# Patient Record
Sex: MALE | Race: BLACK OR AFRICAN AMERICAN | Employment: FULL TIME | URBAN - METROPOLITAN AREA
[De-identification: names, ages, dates, MRNs, and addresses within clinical notes are randomized per-mention and may not be internally consistent; named-entity substitution may affect disease eponyms.]

---

## 2017-08-27 ENCOUNTER — APPOINTMENT (OUTPATIENT)
Dept: GENERAL RADIOLOGY | Age: 37
End: 2017-08-27
Payer: COMMERCIAL

## 2017-08-27 ENCOUNTER — HOSPITAL ENCOUNTER (EMERGENCY)
Age: 37
Discharge: HOME | End: 2017-08-27
Attending: EMERGENCY MEDICINE
Payer: COMMERCIAL

## 2017-08-27 VITALS
HEIGHT: 74 IN | TEMPERATURE: 97.9 F | OXYGEN SATURATION: 99 % | DIASTOLIC BLOOD PRESSURE: 102 MMHG | WEIGHT: 315 LBS | BODY MASS INDEX: 40.43 KG/M2 | HEART RATE: 91 BPM | RESPIRATION RATE: 16 BRPM | SYSTOLIC BLOOD PRESSURE: 151 MMHG

## 2017-08-27 DIAGNOSIS — M25.561 ACUTE PAIN OF RIGHT KNEE: Primary | ICD-10-CM

## 2017-08-27 PROCEDURE — 6370000000 HC RX 637 (ALT 250 FOR IP): Performed by: EMERGENCY MEDICINE

## 2017-08-27 PROCEDURE — G0382 LEV 3 HOSP TYPE B ED VISIT: HCPCS

## 2017-08-27 PROCEDURE — 73562 X-RAY EXAM OF KNEE 3: CPT

## 2017-08-27 RX ORDER — IBUPROFEN 800 MG/1
800 TABLET ORAL ONCE
Status: COMPLETED | OUTPATIENT
Start: 2017-08-27 | End: 2017-08-27

## 2017-08-27 RX ORDER — IBUPROFEN 800 MG/1
800 TABLET ORAL EVERY 8 HOURS PRN
Qty: 30 TABLET | Refills: 0 | Status: SHIPPED | OUTPATIENT
Start: 2017-08-27 | End: 2018-09-17 | Stop reason: SDUPTHER

## 2017-08-27 RX ORDER — ACETAMINOPHEN 500 MG
1000 TABLET ORAL EVERY 8 HOURS PRN
Qty: 30 TABLET | Refills: 0 | Status: SHIPPED | OUTPATIENT
Start: 2017-08-27 | End: 2018-12-14

## 2017-08-27 RX ADMIN — IBUPROFEN 800 MG: 800 TABLET, FILM COATED ORAL at 11:31

## 2017-08-27 ASSESSMENT — PAIN DESCRIPTION - LOCATION: LOCATION: KNEE

## 2017-08-27 ASSESSMENT — PAIN DESCRIPTION - ORIENTATION: ORIENTATION: RIGHT

## 2017-08-27 ASSESSMENT — PAIN SCALES - GENERAL
PAINLEVEL_OUTOF10: 10
PAINLEVEL_OUTOF10: 10

## 2017-08-27 ASSESSMENT — ENCOUNTER SYMPTOMS
STRIDOR: 0
SHORTNESS OF BREATH: 0
COUGH: 0
WHEEZING: 0

## 2017-08-27 ASSESSMENT — PAIN DESCRIPTION - FREQUENCY: FREQUENCY: INTERMITTENT

## 2017-08-27 ASSESSMENT — PAIN DESCRIPTION - DESCRIPTORS: DESCRIPTORS: ACHING

## 2017-08-27 ASSESSMENT — PAIN DESCRIPTION - PAIN TYPE: TYPE: ACUTE PAIN

## 2018-09-17 ENCOUNTER — HOSPITAL ENCOUNTER (EMERGENCY)
Age: 38
Discharge: HOME | End: 2018-09-17
Attending: EMERGENCY MEDICINE
Payer: COMMERCIAL

## 2018-09-17 VITALS
OXYGEN SATURATION: 95 % | WEIGHT: 315 LBS | HEIGHT: 74 IN | RESPIRATION RATE: 17 BRPM | SYSTOLIC BLOOD PRESSURE: 161 MMHG | TEMPERATURE: 98 F | BODY MASS INDEX: 40.43 KG/M2 | DIASTOLIC BLOOD PRESSURE: 102 MMHG | HEART RATE: 105 BPM

## 2018-09-17 DIAGNOSIS — M54.31 SCIATICA OF RIGHT SIDE: Primary | ICD-10-CM

## 2018-09-17 PROCEDURE — 6360000002 HC RX W HCPCS: Performed by: NURSE PRACTITIONER

## 2018-09-17 PROCEDURE — 99283 EMERGENCY DEPT VISIT LOW MDM: CPT

## 2018-09-17 PROCEDURE — 96372 THER/PROPH/DIAG INJ SC/IM: CPT

## 2018-09-17 RX ORDER — ORPHENADRINE CITRATE 30 MG/ML
60 INJECTION INTRAMUSCULAR; INTRAVENOUS ONCE
Status: COMPLETED | OUTPATIENT
Start: 2018-09-17 | End: 2018-09-17

## 2018-09-17 RX ORDER — CYCLOBENZAPRINE HCL 10 MG
10 TABLET ORAL 2 TIMES DAILY PRN
Qty: 14 TABLET | Refills: 0 | Status: SHIPPED | OUTPATIENT
Start: 2018-09-17 | End: 2018-09-27

## 2018-09-17 RX ORDER — KETOROLAC TROMETHAMINE 30 MG/ML
30 INJECTION, SOLUTION INTRAMUSCULAR; INTRAVENOUS ONCE
Status: COMPLETED | OUTPATIENT
Start: 2018-09-17 | End: 2018-09-17

## 2018-09-17 RX ORDER — IBUPROFEN 800 MG/1
800 TABLET ORAL EVERY 8 HOURS PRN
Qty: 30 TABLET | Refills: 0 | Status: SHIPPED | OUTPATIENT
Start: 2018-09-17 | End: 2018-12-14

## 2018-09-17 RX ADMIN — ORPHENADRINE CITRATE 60 MG: 30 INJECTION INTRAMUSCULAR; INTRAVENOUS at 13:41

## 2018-09-17 RX ADMIN — KETOROLAC TROMETHAMINE 30 MG: 30 INJECTION, SOLUTION INTRAMUSCULAR at 13:41

## 2018-09-17 ASSESSMENT — PAIN SCALES - GENERAL: PAINLEVEL_OUTOF10: 5

## 2018-09-17 NOTE — ED PROVIDER NOTES
[facility] ED     Emergency Department     Faculty Attestation    I performed a history and physical examination of the patient and discussed management with the resident. I reviewed the residents note and agree with the documented findings and plan of care. Any areas of disagreement are noted on the chart. I was personally present for the key portions of any procedures. I have documented in the chart those procedures where I was not present during the key portions. I have reviewed the emergency nurses triage note. I agree with the chief complaint, past medical history, past surgical history, allergies, medications, social and family history as documented unless otherwise noted below. For Physician Assistant/ Nurse Practitioner cases/documentation I have personally evaluated this patient and have completed at least one if not all key elements of the E/M (history, physical exam, and MDM). Additional findings are as noted. Right radicular back pain radiating into the right leg. History of sciatica in the past never this severe. Patient denies fevers, anticoagulant use, recent spinal injections or procedures, bowel or bladder changes, or IV drug abuse. Patient is able to stand on heels, tip toes, squat, rise up, and stand on each leg individually without assistance.     Critical Care     none    Jackie Chawla MD, Alcira Jurado  Attending Emergency  Physician             Jackie Chawla MD  09/17/18 [id number]

## 2018-12-14 ENCOUNTER — HOSPITAL ENCOUNTER (EMERGENCY)
Age: 38
Discharge: HOME | End: 2018-12-14
Attending: EMERGENCY MEDICINE
Payer: COMMERCIAL

## 2018-12-14 VITALS
DIASTOLIC BLOOD PRESSURE: 96 MMHG | TEMPERATURE: 99.1 F | RESPIRATION RATE: 16 BRPM | HEART RATE: 78 BPM | OXYGEN SATURATION: 100 % | SYSTOLIC BLOOD PRESSURE: 178 MMHG

## 2018-12-14 DIAGNOSIS — M10.9 ACUTE GOUT OF LEFT FOOT, UNSPECIFIED CAUSE: Primary | ICD-10-CM

## 2018-12-14 PROCEDURE — G0382 LEV 3 HOSP TYPE B ED VISIT: HCPCS

## 2018-12-14 PROCEDURE — 6370000000 HC RX 637 (ALT 250 FOR IP): Performed by: STUDENT IN AN ORGANIZED HEALTH CARE EDUCATION/TRAINING PROGRAM

## 2018-12-14 RX ORDER — PREDNISONE 20 MG/1
40 TABLET ORAL ONCE
Status: COMPLETED | OUTPATIENT
Start: 2018-12-14 | End: 2018-12-14

## 2018-12-14 RX ORDER — IBUPROFEN 800 MG/1
800 TABLET ORAL ONCE
Status: COMPLETED | OUTPATIENT
Start: 2018-12-14 | End: 2018-12-14

## 2018-12-14 RX ORDER — NAPROXEN 250 MG/1
500 TABLET ORAL 2 TIMES DAILY WITH MEALS
Qty: 14 TABLET | Refills: 0 | OUTPATIENT
Start: 2018-12-14 | End: 2021-10-01

## 2018-12-14 RX ORDER — COLCHICINE 0.6 MG/1
0.6 TABLET ORAL DAILY
COMMUNITY
End: 2019-04-29 | Stop reason: SDUPTHER

## 2018-12-14 RX ORDER — PREDNISONE 20 MG/1
TABLET ORAL
Qty: 10 TABLET | Refills: 0 | Status: SHIPPED | OUTPATIENT
Start: 2018-12-14 | End: 2018-12-24

## 2018-12-14 RX ADMIN — PREDNISONE 40 MG: 20 TABLET ORAL at 15:38

## 2018-12-14 RX ADMIN — IBUPROFEN 800 MG: 800 TABLET, FILM COATED ORAL at 15:33

## 2018-12-14 ASSESSMENT — ENCOUNTER SYMPTOMS
COUGH: 0
WHEEZING: 0
TROUBLE SWALLOWING: 0
DIARRHEA: 0
NAUSEA: 0
ABDOMINAL PAIN: 0
SHORTNESS OF BREATH: 0
STRIDOR: 0
SORE THROAT: 0
CONSTIPATION: 0
BACK PAIN: 0
ABDOMINAL DISTENTION: 0
COLOR CHANGE: 0
EYE REDNESS: 0
PHOTOPHOBIA: 0
VOMITING: 0

## 2018-12-14 ASSESSMENT — PAIN DESCRIPTION - ORIENTATION: ORIENTATION: LEFT

## 2018-12-14 ASSESSMENT — PAIN DESCRIPTION - LOCATION: LOCATION: FOOT

## 2018-12-14 ASSESSMENT — PAIN SCALES - GENERAL: PAINLEVEL_OUTOF10: 8

## 2018-12-14 NOTE — ED PROVIDER NOTES
[facility]     Emergency Department     Faculty Attestation    I performed a history and physical examination of the patient and discussed management with the resident. I reviewed the residents note and agree with the documented findings including all diagnostic interpretations and plan of care. Any areas of disagreement are noted on the chart. I was personally present for the key portions of any procedures. I have documented in the chart those procedures where I was not present during the key portions. I have reviewed the emergency nurses triage note. I agree with the chief complaint, past medical history, past surgical history, allergies, medications, social and family history as documented unless otherwise noted below. Documentation of the HPI, Physical Exam and Medical Decision Making performed by scriblisbeth is based on my personal performance of the HPI, PE and MDM. For Physician Assistant/ Nurse Practitioner cases/documentation I have personally evaluated this patient and have completed at least one if not all key elements of the E/M (history, physical exam, and MDM). Additional findings are as noted. Primary Care Physician: Jocy Lane MD    History: This is a 34 y.o. male who presents to the Emergency Department with complaint of foot pain. History of gout. No trauma. Hasn't taken his cultures seen in the past 4 days which usually relieves his symptoms however it is still persisted. No fevers. No numbness or weakness. Physical:     oral temperature is 99.1 °F (37.3 °C). His blood pressure is 178/96 (abnormal) and his pulse is 78. His respiration is 16 and oxygen saturation is 100%. 34 y.o. male no acute distress, over the left foot MTP there is some swelling tenderness and very mild warmth, no significant erythema. Normal range of motion of the toes. Capillary refill less than 3 seconds. Normal sensation.     Impression: Gout,

## 2018-12-14 NOTE — ED PROVIDER NOTES
[facility]  ED  Emergency Department Encounter  Emergency Medicine Resident     Pt Name: Jasper Sarmiento  MRN: [id number]  [place] 1980  Date of evaluation: 12/14/18  PCP:  Kandice Monaco MD    [address] [place] [address]       Chief Complaint   Patient presents with    Foot Pain     pt reports he thought his left foot was hurting due to gout, pt states normally if he takes four days worth of his gout medication it will go away. today is day five of taking his meds with no improvement. no known injury       HISTORY OFPRESENT ILLNESS  (Location/Symptom, Timing/Onset, Context/Setting, Quality, Duration, Modifying Factors,Severity.)      Jasper Sarmiento is a 42yo male who presents with Plans of left foot pain. Patient states that he has a history of gout, DVT, PE, IVC filter. Pt states his left foot pain began approximately 4 days ago, he started taking colchicine at that time. Patient states this is his typical gout flare. He denies any trauma, fevers, wounds, history of diabetes. Patient is able to ambulate, however he states that this makes the pain worse. Patient states that his flares typically and after 4 days but this is the fifth day and the pain has not improved. Pt has not taken any NSAIDs. Pt denies numbness, weakness, tingling. PAST MEDICAL / SURGICAL / SOCIAL / FAMILY HISTORY      has a past medical history of DVT, lower extremity ([address] Hypertension; Obesity; PE (pulmonary embolism); Post-thrombotic syndrome; Psychiatric problem; and Unspecified diseases of blood and blood-forming organs. has a past surgical history that includes Vena Cava Filter Placement. Social History     Social History    Marital status: Single     Spouse name: N/A    Number of children: N/A    Years of education: N/A     Occupational History    Not on file.      Social History Main Topics    Smoking status: Former Smoker     Packs/day: 0.25     Years: 10.00     Types: Cigarettes    Smokeless level 4, 8 or more forlevel 5)      INITIAL VITALS:   ED Triage Vitals [12/14/18 1517]   BP Temp Temp Source Pulse Resp SpO2 Height Weight   (!) 178/96 99.1 °F (37.3 °C) Oral 78 16 100 % -- --       Physical Exam   Constitutional: He is oriented to person, place, and time. He appears well-developed and well-nourished. No distress. HENT:   Head: Normocephalic and atraumatic. Nose: Nose normal.   Eyes: Pupils are equal, round, and reactive to light. Conjunctivae and EOM are normal. No scleral icterus. Neck: Normal range of motion. Neck supple. No JVD present. No tracheal deviation present. Cardiovascular: Normal rate, regular rhythm, normal heart sounds and intact distal pulses. Exam reveals no gallop and no friction rub. No murmur heard. Pulses:       Dorsalis pedis pulses are 2+ on the right side, and 2+ on the left side. Posterior tibial pulses are 2+ on the right side, and 2+ on the left side. Pulmonary/Chest: Effort normal and breath sounds normal. No stridor. No respiratory distress. He has no wheezes. He has no rales. He exhibits no tenderness. Abdominal: Soft. Bowel sounds are normal. He exhibits no distension and no mass. There is no tenderness. There is no rebound and no guarding. Musculoskeletal: Normal range of motion. He exhibits edema and tenderness. He exhibits no deformity. Neurological: He is alert and oriented to person, place, and time. No sensory deficit. He exhibits normal muscle tone. Skin: Skin is warm and dry. Capillary refill takes less than 2 seconds. No rash noted. He is not diaphoretic. No erythema. No pallor. Psychiatric: He has a normal mood and affect. His behavior is normal. Thought content normal.   Nursing note and vitals reviewed. DIFFERENTIAL  DIAGNOSIS     PLAN (LABS / IMAGING / EKG):  No orders of the defined types were placed in this encounter.       MEDICATIONS ORDERED:  Orders Placed This Encounter   Medications    ibuprofen (ADVIL;MOTRIN)

## 2019-04-29 ENCOUNTER — APPOINTMENT (OUTPATIENT)
Dept: GENERAL RADIOLOGY | Age: 39
End: 2019-04-29
Payer: COMMERCIAL

## 2019-04-29 ENCOUNTER — HOSPITAL ENCOUNTER (EMERGENCY)
Age: 39
Discharge: HOME | End: 2019-04-29
Attending: EMERGENCY MEDICINE
Payer: COMMERCIAL

## 2019-04-29 VITALS
SYSTOLIC BLOOD PRESSURE: 145 MMHG | DIASTOLIC BLOOD PRESSURE: 94 MMHG | TEMPERATURE: 97.9 F | HEART RATE: 103 BPM | OXYGEN SATURATION: 100 % | RESPIRATION RATE: 20 BRPM

## 2019-04-29 DIAGNOSIS — M79.671 RIGHT FOOT PAIN: ICD-10-CM

## 2019-04-29 DIAGNOSIS — L03.115 CELLULITIS OF RIGHT FOOT: Primary | ICD-10-CM

## 2019-04-29 PROCEDURE — 6370000000 HC RX 637 (ALT 250 FOR IP): Performed by: EMERGENCY MEDICINE

## 2019-04-29 PROCEDURE — 99283 EMERGENCY DEPT VISIT LOW MDM: CPT

## 2019-04-29 PROCEDURE — 73630 X-RAY EXAM OF FOOT: CPT

## 2019-04-29 RX ORDER — IBUPROFEN 800 MG/1
800 TABLET ORAL ONCE
Status: COMPLETED | OUTPATIENT
Start: 2019-04-29 | End: 2019-04-29

## 2019-04-29 RX ORDER — ACETAMINOPHEN 500 MG
1000 TABLET ORAL ONCE
Status: COMPLETED | OUTPATIENT
Start: 2019-04-29 | End: 2019-04-29

## 2019-04-29 RX ORDER — IBUPROFEN 800 MG/1
800 TABLET ORAL EVERY 8 HOURS PRN
Qty: 30 TABLET | Refills: 0 | Status: SHIPPED | OUTPATIENT
Start: 2019-04-29 | End: 2019-10-27

## 2019-04-29 RX ORDER — COLCHICINE 0.6 MG/1
0.6 TABLET ORAL DAILY
Qty: 4 TABLET | Refills: 0 | Status: SHIPPED | OUTPATIENT
Start: 2019-04-29 | End: 2021-08-26

## 2019-04-29 RX ORDER — CEPHALEXIN 250 MG/1
500 CAPSULE ORAL ONCE
Status: COMPLETED | OUTPATIENT
Start: 2019-04-29 | End: 2019-04-29

## 2019-04-29 RX ORDER — ACETAMINOPHEN 325 MG/1
650 TABLET ORAL EVERY 8 HOURS PRN
Qty: 30 TABLET | Refills: 0 | Status: SHIPPED | OUTPATIENT
Start: 2019-04-29 | End: 2021-10-01

## 2019-04-29 RX ORDER — CEPHALEXIN 500 MG/1
500 CAPSULE ORAL 4 TIMES DAILY
Qty: 28 CAPSULE | Refills: 0 | Status: SHIPPED | OUTPATIENT
Start: 2019-04-29 | End: 2019-05-06

## 2019-04-29 RX ADMIN — CEPHALEXIN 500 MG: 250 CAPSULE ORAL at 08:53

## 2019-04-29 RX ADMIN — IBUPROFEN 800 MG: 800 TABLET, FILM COATED ORAL at 08:53

## 2019-04-29 RX ADMIN — ACETAMINOPHEN 1000 MG: 500 TABLET ORAL at 08:53

## 2019-04-29 ASSESSMENT — PAIN DESCRIPTION - ORIENTATION: ORIENTATION: RIGHT

## 2019-04-29 ASSESSMENT — ENCOUNTER SYMPTOMS
NAUSEA: 0
VOMITING: 0
CHEST TIGHTNESS: 0
ABDOMINAL PAIN: 0
COLOR CHANGE: 0
SHORTNESS OF BREATH: 0
DIARRHEA: 0

## 2019-04-29 ASSESSMENT — PAIN SCALES - GENERAL
PAINLEVEL_OUTOF10: 7
PAINLEVEL_OUTOF10: 7

## 2019-04-29 ASSESSMENT — PAIN DESCRIPTION - DESCRIPTORS: DESCRIPTORS: ACHING

## 2019-04-29 ASSESSMENT — PAIN DESCRIPTION - LOCATION: LOCATION: FOOT

## 2019-04-29 ASSESSMENT — PAIN DESCRIPTION - PAIN TYPE: TYPE: ACUTE PAIN;CHRONIC PAIN

## 2019-04-29 ASSESSMENT — PAIN DESCRIPTION - ONSET: ONSET: ON-GOING

## 2019-04-29 ASSESSMENT — PAIN DESCRIPTION - FREQUENCY: FREQUENCY: CONTINUOUS

## 2019-04-29 NOTE — ED NOTES
Pt complain of right foot pain. Pt states he feels that he is having a flare up of his gout. Pt states he has been out of his medications x1 week. Pt states pain has been going on for 3 days. Pt denies any falls or injuries.      Jose Schaefer RN  04/29/19 [id number]

## 2019-04-29 NOTE — ED PROVIDER NOTES
[facility]  ED  eMERGENCY dEPARTMENT eNCOUnter   Attending Attestation     Pt Name: Daphne De Leon  MRN: [id number]  [place] 1980  Date of evaluation: 4/29/19       Daphne De Leon is a 37 y.o. male who presents with Foot Pain (right foot, pt feels having gout flare out, out of gout meds x1 week)      History: Pt with pain over the foot by the talo navicular joint on the right lateral side. There is warmth and tenderness. Pt states it hurts to walk. Exam: Tenderness over talo navicular joint. There is mild warmth. Plan for xray, nsaids, consider abx. Discharge to see podiatry and PCP. No leg swelling, pt has IVC filter. No SOB. I performed a history and physical examination of the patient and discussed management with the resident. I reviewed the residents note and agree with the documented findings and plan of care. Any areas of disagreement are noted on the chart. I was personally present for the key portions of any procedures. I have documented in the chart those procedures where I was not present during the key portions. I have personally reviewed all images and agree with the resident's interpretation. I have reviewed the emergency nurses triage note. I agree with the chief complaint, past medical history, past surgical history, allergies, medications, social and family history as documented unless otherwise noted below. Documentation of the HPI, Physical Exam and Medical Decision Making performed by medical students or scribes is based on my personal performance of the HPI, PE and MDM. For Phys Assistant/ Nurse Practitioner cases/documentation I have had a face to face evaluation of this patient and have completed at least one if not all key elements of the E/M (history, physical exam, and MDM). Additional findings are as noted.     For APC cases I have personally evaluated and examined the patient in conjunction with the APC and agree with the treatment plan and disposition of the patient as recorded by the APC.     Omar Underwood MD  Attending Emergency  Physician       Cristina Silva MD  04/29/19 [id number]

## 2019-04-29 NOTE — ED PROVIDER NOTES
[facility]  ED  Emergency Department Encounter  EmergencyMedicine Resident     Pt Name:Drew Edwards  MRN: [id number]  [place] 1980  Date of evaluation: 4/29/19  PCP:  No primary care provider on file. CHIEF COMPLAINT       Chief Complaint   Patient presents with    Foot Pain     right foot, pt feels having gout flare out, out of gout meds x1 week       HISTORY OF PRESENT ILLNESS  (Location/Symptom, Timing/Onset, Context/Setting, Quality, Duration, Modifying Factors, Severity.)      Ena Medina is a 37 y.o. male who presents with right-sided foot pain over the fourth and fifth proximal metatarsals. Some localized swelling and warmth over that area. History of gout, says that it feels similar to gout however usually his gout his on the big toe. No fevers or chills, no calf pain or swelling. No pain or swelling of the bilateral ankles. No chest pain or shortness of breath, history of PE and DVT previously, not on anticoagulation but has a DVT filter in place. Denies any history of trauma or falls. Denies any significant redness in the area, denies any significant swelling. PAST MEDICAL / SURGICAL / SOCIAL / FAMILY HISTORY      has a past medical history of DVT, lower extremity ([address] Hypertension, Obesity, PE (pulmonary embolism), Post-thrombotic syndrome, Psychiatric problem, and Unspecified diseases of blood and blood-forming organs. has a past surgical history that includes Vena Cava Filter Placement.     Social History     Socioeconomic History    Marital status: Single     Spouse name: Not on file    Number of children: Not on file    Years of education: Not on file    Highest education level: Not on file   Occupational History    Not on file   Social Needs    Financial resource strain: Not on file    Food insecurity:     Worry: Not on file     Inability: Not on file    Transportation needs:     Medical: Not on file     Non-medical: Not on file   Tobacco Use    Smoking status: Former Smoker     Packs/day: 0.25     Years: 10.00     Pack years: 2.50     Types: Cigarettes    Smokeless tobacco: Never Used   Substance and Sexual Activity    Alcohol use: Yes     Comment: occas    Drug use: No    Sexual activity: Not on file   Lifestyle    Physical activity:     Days per week: Not on file     Minutes per session: Not on file    Stress: Not on file   Relationships    Social connections:     Talks on phone: Not on file     Gets together: Not on file     Attends Baptism service: Not on file     Active member of club or organization: Not on file     Attends meetings of clubs or organizations: Not on file     Relationship status: Not on file    Intimate partner violence:     Fear of current or ex partner: Not on file     Emotionally abused: Not on file     Physically abused: Not on file     Forced sexual activity: Not on file   Other Topics Concern    Not on file   Social History Narrative    Not on file       Family History   Problem Relation Age of Onset    Diabetes Mother     Colon Cancer Mother        Allergies:  Patient has no known allergies. Home Medications:  Prior to Admission medications    Medication Sig Start Date End Date Taking? Authorizing Provider   colchicine (COLCRYS) 0.6 MG tablet Take 0.6 mg by mouth daily   Yes Historical Provider, MD   naproxen (NAPROSYN) 250 MG tablet Take 2 tablets by mouth 2 times daily (with meals) 12/14/18  Yes Anne Voss DO   LISINOPRIL PO   Take 20 mg by mouth daily    Yes Historical Provider, MD       REVIEW OF SYSTEMS    (2-9 systems for level 4, 10 or more for level 5)      Review of Systems   Constitutional: Negative for chills and fever. Respiratory: Negative for chest tightness and shortness of breath. Cardiovascular: Negative for chest pain and leg swelling. Gastrointestinal: Negative for abdominal pain, diarrhea, nausea and vomiting. Musculoskeletal: Positive for arthralgias.  Negative for joint symptomatically with ibuprofen and Tylenol. Patient was given a PCP clinic list as well as follow-up instructions with podiatry. Return to ER for worsening symptoms. Patient continues to deny chest pain, shortness of breath, denies any leg pain, denies any calf pain, denies any leg swelling or calf swelling. Lower concern for a DVT or PE given that there are no symptoms of the r legs and the pain is localized over the fourth and fifth metatarsal of the right foot    PROCEDURES:  None    CONSULTS:  None    CRITICAL CARE:  None    FINAL IMPRESSION      1. Cellulitis of right foot    2. Right foot pain          DISPOSITION / PLAN     DISPOSITION  Discharge      PATIENT REFERRED TO:  No follow-up provider specified.     DISCHARGE MEDICATIONS:  New Prescriptions    No medications on file       Mar Pino MD  Emergency Medicine Resident    (Please note that portions of thisnote were completed with a voice recognition program.  Efforts were made to edit the dictations but occasionally words are mis-transcribed.)       Mar Pino MD  04/29/19 [id number] Shayla Amor MD  04/29/19 [id number]

## 2019-05-09 ENCOUNTER — HOSPITAL ENCOUNTER (OUTPATIENT)
Age: 39
Setting detail: SPECIMEN
Discharge: HOME | End: 2019-05-09
Payer: COMMERCIAL

## 2019-05-09 LAB
ABSOLUTE EOS #: 0.07 K/UL (ref 0–0.44)
ABSOLUTE IMMATURE GRANULOCYTE: 0.03 K/UL (ref 0–0.3)
ABSOLUTE LYMPH #: 2.31 K/UL (ref 1.1–3.7)
ABSOLUTE MONO #: 0.46 K/UL (ref 0.1–1.2)
ALBUMIN SERPL-MCNC: 4.2 G/DL (ref 3.5–5.2)
ALBUMIN/GLOBULIN RATIO: 1.2 (ref 1–2.5)
ALP BLD-CCNC: 104 U/L (ref 40–129)
ALT SERPL-CCNC: 28 U/L (ref 5–41)
ANION GAP SERPL CALCULATED.3IONS-SCNC: 15 MMOL/L (ref 9–17)
AST SERPL-CCNC: 26 U/L
BASOPHILS # BLD: 0 % (ref 0–2)
BASOPHILS ABSOLUTE: <0.03 K/UL (ref 0–0.2)
BILIRUB SERPL-MCNC: 0.22 MG/DL (ref 0.3–1.2)
BUN BLDV-MCNC: 18 MG/DL (ref 6–20)
BUN/CREAT BLD: ABNORMAL (ref 9–20)
CALCIUM SERPL-MCNC: 9.8 MG/DL (ref 8.6–10.4)
CHLORIDE BLD-SCNC: 101 MMOL/L (ref 98–107)
CHOLESTEROL/HDL RATIO: 3.6
CHOLESTEROL: 217 MG/DL
CO2: 25 MMOL/L (ref 20–31)
CREAT SERPL-MCNC: 0.92 MG/DL (ref 0.7–1.2)
CREATININE URINE: 106 MG/DL (ref 39–259)
DIFFERENTIAL TYPE: ABNORMAL
EOSINOPHILS RELATIVE PERCENT: 1 % (ref 1–4)
GFR AFRICAN AMERICAN: >60 ML/MIN
GFR NON-AFRICAN AMERICAN: >60 ML/MIN
GFR SERPL CREATININE-BSD FRML MDRD: ABNORMAL ML/MIN/{1.73_M2}
GFR SERPL CREATININE-BSD FRML MDRD: ABNORMAL ML/MIN/{1.73_M2}
GLUCOSE BLD-MCNC: 94 MG/DL (ref 70–99)
HCT VFR BLD CALC: 46 % (ref 40.7–50.3)
HDLC SERPL-MCNC: 60 MG/DL
HEMOGLOBIN: 15.1 G/DL (ref 13–17)
IMMATURE GRANULOCYTES: 1 %
LDL CHOLESTEROL: 141 MG/DL (ref 0–130)
LYMPHOCYTES # BLD: 35 % (ref 24–43)
MCH RBC QN AUTO: 32.5 PG (ref 25.2–33.5)
MCHC RBC AUTO-ENTMCNC: 32.8 G/DL (ref 28.4–34.8)
MCV RBC AUTO: 99.1 FL (ref 82.6–102.9)
MICROALBUMIN/CREAT 24H UR: 37 MG/L
MICROALBUMIN/CREAT UR-RTO: 35 MCG/MG CREAT
MONOCYTES # BLD: 7 % (ref 3–12)
NRBC AUTOMATED: 0 PER 100 WBC
PDW BLD-RTO: 14.1 % (ref 11.8–14.4)
PLATELET # BLD: 342 K/UL (ref 138–453)
PLATELET ESTIMATE: ABNORMAL
PMV BLD AUTO: 9.2 FL (ref 8.1–13.5)
POTASSIUM SERPL-SCNC: 4.9 MMOL/L (ref 3.7–5.3)
RBC # BLD: 4.64 M/UL (ref 4.21–5.77)
RBC # BLD: ABNORMAL 10*6/UL
SEG NEUTROPHILS: 56 % (ref 36–65)
SEGMENTED NEUTROPHILS ABSOLUTE COUNT: 3.68 K/UL (ref 1.5–8.1)
SODIUM BLD-SCNC: 141 MMOL/L (ref 135–144)
TOTAL PROTEIN: 7.6 G/DL (ref 6.4–8.3)
TRIGL SERPL-MCNC: 81 MG/DL
VLDLC SERPL CALC-MCNC: ABNORMAL MG/DL (ref 1–30)
WBC # BLD: 6.6 K/UL (ref 3.5–11.3)
WBC # BLD: ABNORMAL 10*3/UL

## 2019-05-10 LAB
ESTIMATED AVERAGE GLUCOSE: 126 MG/DL
HBA1C MFR BLD: 6 % (ref 4–6)

## 2019-10-27 ENCOUNTER — HOSPITAL ENCOUNTER (EMERGENCY)
Age: 39
Discharge: HOME | End: 2019-10-27
Attending: EMERGENCY MEDICINE
Payer: COMMERCIAL

## 2019-10-27 VITALS
RESPIRATION RATE: 20 BRPM | DIASTOLIC BLOOD PRESSURE: 101 MMHG | HEART RATE: 115 BPM | SYSTOLIC BLOOD PRESSURE: 144 MMHG | BODY MASS INDEX: 40.43 KG/M2 | HEIGHT: 74 IN | OXYGEN SATURATION: 98 % | TEMPERATURE: 98.5 F | WEIGHT: 315 LBS

## 2019-10-27 DIAGNOSIS — M10.9 ACUTE GOUT OF RIGHT HAND, UNSPECIFIED CAUSE: Primary | ICD-10-CM

## 2019-10-27 PROCEDURE — 6370000000 HC RX 637 (ALT 250 FOR IP): Performed by: STUDENT IN AN ORGANIZED HEALTH CARE EDUCATION/TRAINING PROGRAM

## 2019-10-27 PROCEDURE — 99283 EMERGENCY DEPT VISIT LOW MDM: CPT

## 2019-10-27 RX ORDER — IBUPROFEN 600 MG/1
600 TABLET ORAL EVERY 6 HOURS PRN
Qty: 30 TABLET | Refills: 0 | Status: SHIPPED | OUTPATIENT
Start: 2019-10-27 | End: 2021-06-28

## 2019-10-27 RX ORDER — IBUPROFEN 800 MG/1
800 TABLET ORAL ONCE
Status: COMPLETED | OUTPATIENT
Start: 2019-10-27 | End: 2019-10-27

## 2019-10-27 RX ADMIN — IBUPROFEN 800 MG: 800 TABLET, FILM COATED ORAL at 02:47

## 2019-10-27 ASSESSMENT — PAIN DESCRIPTION - FREQUENCY: FREQUENCY: CONTINUOUS

## 2019-10-27 ASSESSMENT — PAIN DESCRIPTION - ORIENTATION: ORIENTATION: RIGHT

## 2019-10-27 ASSESSMENT — PAIN SCALES - GENERAL
PAINLEVEL_OUTOF10: 9
PAINLEVEL_OUTOF10: 9

## 2019-10-27 ASSESSMENT — PAIN DESCRIPTION - ONSET: ONSET: ON-GOING

## 2019-10-27 ASSESSMENT — PAIN DESCRIPTION - LOCATION: LOCATION: HAND

## 2019-10-27 ASSESSMENT — PAIN DESCRIPTION - PAIN TYPE: TYPE: ACUTE PAIN

## 2019-10-27 ASSESSMENT — PAIN DESCRIPTION - DESCRIPTORS: DESCRIPTORS: ACHING;CONSTANT;CRUSHING;DISCOMFORT;DULL

## 2019-10-27 ASSESSMENT — PAIN DESCRIPTION - PROGRESSION: CLINICAL_PROGRESSION: NOT CHANGED

## 2019-10-28 ASSESSMENT — ENCOUNTER SYMPTOMS
RHINORRHEA: 0
NAUSEA: 0
SHORTNESS OF BREATH: 0
WHEEZING: 0
ABDOMINAL PAIN: 0
PHOTOPHOBIA: 0
VOMITING: 0

## 2020-11-02 ENCOUNTER — APPOINTMENT (OUTPATIENT)
Dept: GENERAL RADIOLOGY | Age: 40
End: 2020-11-02
Payer: COMMERCIAL

## 2020-11-02 ENCOUNTER — HOSPITAL ENCOUNTER (EMERGENCY)
Age: 40
Discharge: HOME | End: 2020-11-02
Attending: EMERGENCY MEDICINE
Payer: COMMERCIAL

## 2020-11-02 VITALS
HEART RATE: 94 BPM | DIASTOLIC BLOOD PRESSURE: 88 MMHG | SYSTOLIC BLOOD PRESSURE: 129 MMHG | WEIGHT: 300 LBS | TEMPERATURE: 98.8 F | HEIGHT: 74 IN | OXYGEN SATURATION: 96 % | BODY MASS INDEX: 38.5 KG/M2 | RESPIRATION RATE: 20 BRPM

## 2020-11-02 LAB
ABSOLUTE EOS #: 0.06 K/UL (ref 0–0.44)
ABSOLUTE IMMATURE GRANULOCYTE: 0.03 K/UL (ref 0–0.3)
ABSOLUTE LYMPH #: 1.95 K/UL (ref 1.1–3.7)
ABSOLUTE MONO #: 0.51 K/UL (ref 0.1–1.2)
ALBUMIN SERPL-MCNC: 3.9 G/DL (ref 3.5–5.2)
ALBUMIN/GLOBULIN RATIO: 1.1 (ref 1–2.5)
ALP BLD-CCNC: 98 U/L (ref 40–129)
ALT SERPL-CCNC: 20 U/L (ref 5–41)
ANION GAP SERPL CALCULATED.3IONS-SCNC: 11 MMOL/L (ref 9–17)
AST SERPL-CCNC: 21 U/L
BASOPHILS # BLD: 1 % (ref 0–2)
BASOPHILS ABSOLUTE: 0.04 K/UL (ref 0–0.2)
BILIRUB SERPL-MCNC: 0.43 MG/DL (ref 0.3–1.2)
BUN BLDV-MCNC: 13 MG/DL (ref 6–20)
BUN/CREAT BLD: NORMAL (ref 9–20)
CALCIUM SERPL-MCNC: 9.5 MG/DL (ref 8.6–10.4)
CHLORIDE BLD-SCNC: 103 MMOL/L (ref 98–107)
CO2: 27 MMOL/L (ref 20–31)
CREAT SERPL-MCNC: 0.96 MG/DL (ref 0.7–1.2)
DIFFERENTIAL TYPE: NORMAL
EOSINOPHILS RELATIVE PERCENT: 1 % (ref 1–4)
GFR AFRICAN AMERICAN: >60 ML/MIN
GFR NON-AFRICAN AMERICAN: >60 ML/MIN
GFR SERPL CREATININE-BSD FRML MDRD: NORMAL ML/MIN/{1.73_M2}
GFR SERPL CREATININE-BSD FRML MDRD: NORMAL ML/MIN/{1.73_M2}
GLUCOSE BLD-MCNC: 99 MG/DL (ref 70–99)
HCT VFR BLD CALC: 47.1 % (ref 40.7–50.3)
HEMOGLOBIN: 15.3 G/DL (ref 13–17)
IMMATURE GRANULOCYTES: 0 %
LIPASE: 20 U/L (ref 13–60)
LYMPHOCYTES # BLD: 26 % (ref 24–43)
MCH RBC QN AUTO: 32.8 PG (ref 25.2–33.5)
MCHC RBC AUTO-ENTMCNC: 32.5 G/DL (ref 28.4–34.8)
MCV RBC AUTO: 101.1 FL (ref 82.6–102.9)
MONOCYTES # BLD: 7 % (ref 3–12)
NRBC AUTOMATED: 0 PER 100 WBC
PDW BLD-RTO: 13.6 % (ref 11.8–14.4)
PLATELET # BLD: 249 K/UL (ref 138–453)
PLATELET ESTIMATE: NORMAL
PMV BLD AUTO: 8.9 FL (ref 8.1–13.5)
POTASSIUM SERPL-SCNC: 4.8 MMOL/L (ref 3.7–5.3)
RBC # BLD: 4.66 M/UL (ref 4.21–5.77)
RBC # BLD: NORMAL 10*6/UL
SEG NEUTROPHILS: 65 % (ref 36–65)
SEGMENTED NEUTROPHILS ABSOLUTE COUNT: 4.94 K/UL (ref 1.5–8.1)
SODIUM BLD-SCNC: 141 MMOL/L (ref 135–144)
TOTAL PROTEIN: 7.4 G/DL (ref 6.4–8.3)
TROPONIN INTERP: NORMAL
TROPONIN T: NORMAL NG/ML
TROPONIN, HIGH SENSITIVITY: 9 NG/L (ref 0–22)
WBC # BLD: 7.5 K/UL (ref 3.5–11.3)
WBC # BLD: NORMAL 10*3/UL

## 2020-11-02 PROCEDURE — 71045 X-RAY EXAM CHEST 1 VIEW: CPT

## 2020-11-02 PROCEDURE — 96374 THER/PROPH/DIAG INJ IV PUSH: CPT

## 2020-11-02 PROCEDURE — 84484 ASSAY OF TROPONIN QUANT: CPT

## 2020-11-02 PROCEDURE — 80053 COMPREHEN METABOLIC PANEL: CPT

## 2020-11-02 PROCEDURE — 85025 COMPLETE CBC W/AUTO DIFF WBC: CPT

## 2020-11-02 PROCEDURE — 99284 EMERGENCY DEPT VISIT MOD MDM: CPT

## 2020-11-02 PROCEDURE — 93005 ELECTROCARDIOGRAM TRACING: CPT | Performed by: STUDENT IN AN ORGANIZED HEALTH CARE EDUCATION/TRAINING PROGRAM

## 2020-11-02 PROCEDURE — 6360000002 HC RX W HCPCS: Performed by: STUDENT IN AN ORGANIZED HEALTH CARE EDUCATION/TRAINING PROGRAM

## 2020-11-02 PROCEDURE — 83690 ASSAY OF LIPASE: CPT

## 2020-11-02 RX ORDER — KETOROLAC TROMETHAMINE 30 MG/ML
30 INJECTION, SOLUTION INTRAMUSCULAR; INTRAVENOUS ONCE
Status: COMPLETED | OUTPATIENT
Start: 2020-11-02 | End: 2020-11-02

## 2020-11-02 RX ADMIN — KETOROLAC TROMETHAMINE 30 MG: 30 INJECTION, SOLUTION INTRAMUSCULAR; INTRAVENOUS at 13:39

## 2020-11-02 ASSESSMENT — ENCOUNTER SYMPTOMS
NAUSEA: 0
SHORTNESS OF BREATH: 0
ABDOMINAL PAIN: 1
DIARRHEA: 0
BACK PAIN: 0
VOMITING: 0

## 2020-11-02 ASSESSMENT — PAIN SCALES - GENERAL: PAINLEVEL_OUTOF10: 0

## 2020-11-02 NOTE — ED PROVIDER NOTES
[facility]  ED  Emergency Department Encounter  Emergency Medicine Resident     Pt Name: Jackie Chiang  MRN: [id number]  [place] 1980  Date of evaluation: 11/2/20  PCP:  No primary care provider on file. CHIEF COMPLAINT       Chief Complaint   Patient presents with    Other     Right side pain x 3 weeks. No trauma       HISTORY OFPRESENT ILLNESS  (Location/Symptom, Timing/Onset, Context/Setting, Quality, Duration, Modifying Factors,Severity.)      Jackie Chiang is a 41 year male who presents with right-sided discomfort for the past month. The patient reports mild discomfort his right upper abdomen and flank. He describes the pain as a \"swelling feeling\". He has not followed up with a physician for his recent diagnosis of colitis about 3 weeks ago. Patient has been evaluated by [facility] and at that time had CT imaging was suggestive of colitis. Patient is not having any diarrhea, fevers at home. He has not been taking anything for symptoms. Denies any chest pain or shortness of breath. The patient does have a history of PE but has an IVC filter. The patient denies any urinary symptoms. PAST MEDICAL / SURGICAL / SOCIAL / FAMILY HISTORY      has a past medical history of DVT, lower extremity ([address] Hypertension, Obesity, PE (pulmonary embolism), Post-thrombotic syndrome, Psychiatric problem, and Unspecified diseases of blood and blood-forming organs. has a past surgical history that includes Vena Cava Filter Placement.      Social History     Socioeconomic History    Marital status: Single     Spouse name: Not on file    Number of children: Not on file    Years of education: Not on file    Highest education level: Not on file   Occupational History    Not on file   Social Needs    Financial resource strain: Not on file    Food insecurity     Worry: Not on file     Inability: Not on file    Transportation needs     Medical: Not on file     Non-medical: Not on file Tobacco Use    Smoking status: Former Smoker     Packs/day: 0.25     Years: 10.00     Pack years: 2.50     Types: Cigarettes    Smokeless tobacco: Never Used   Substance and Sexual Activity    Alcohol use: Yes     Comment: occas    Drug use: No    Sexual activity: Not on file   Lifestyle    Physical activity     Days per week: Not on file     Minutes per session: Not on file    Stress: Not on file   Relationships    Social connections     Talks on phone: Not on file     Gets together: Not on file     Attends Hoahaoism service: Not on file     Active member of club or organization: Not on file     Attends meetings of clubs or organizations: Not on file     Relationship status: Not on file    Intimate partner violence     Fear of current or ex partner: Not on file     Emotionally abused: Not on file     Physically abused: Not on file     Forced sexual activity: Not on file   Other Topics Concern    Not on file   Social History Narrative    Not on file       Family History   Problem Relation Age of Onset    Diabetes Mother     Colon Cancer Mother         Allergies:  Patient has no known allergies. Home Medications:  Prior to Admission medications    Medication Sig Start Date End Date Taking? Authorizing Provider   ibuprofen (ADVIL;MOTRIN) 600 MG tablet Take 1 tablet by mouth every 6 hours as needed for Pain Take with food 10/27/19   James Garcia DO   acetaminophen (TYLENOL) 325 MG tablet Take 2 tablets by mouth every 8 hours as needed for Pain 4/29/19   Dayanara Lo MD   colchicine (COLCRYS) 0.6 MG tablet Take 1 tablet by mouth daily 4/29/19   Dayanara Lo MD   naproxen (NAPROSYN) 250 MG tablet Take 2 tablets by mouth 2 times daily (with meals) 12/14/18   Jack Navarrete DO   LISINOPRIL PO   Take 20 mg by mouth daily     Historical Provider, MD       REVIEW OFSYSTEMS    (2-9 systems for level 4, 10 or more for level 5)      Review of Systems   Constitutional: Negative for chills and fever. Respiratory: Negative for shortness of breath. Cardiovascular: Negative for chest pain. Gastrointestinal: Positive for abdominal pain. Negative for diarrhea, nausea and vomiting. Musculoskeletal: Negative for back pain and neck pain. Skin: Negative for wound. PHYSICAL EXAM   (up to 7 for level 4, 8 or more forlevel 5)      INITIAL VITALS:   ED Triage Vitals [11/02/20 1251]   BP Temp Temp Source Pulse Resp SpO2 Height Weight   (!) 152/90 98.8 °F (37.1 °C) Oral 94 20 99 % 6' 2\" (1.88 m) 300 lb (136.1 kg)       Physical Exam  Constitutional:       General: He is not in acute distress. Appearance: He is well-developed. He is not diaphoretic. HENT:      Head: Normocephalic and atraumatic. Eyes:      Conjunctiva/sclera: Conjunctivae normal.      Pupils: Pupils are equal, round, and reactive to light. Neck:      Musculoskeletal: Neck supple. Cardiovascular:      Rate and Rhythm: Normal rate and regular rhythm. Pulses: Normal pulses. Heart sounds: No murmur. No friction rub. No gallop. Pulmonary:      Effort: Pulmonary effort is normal. No respiratory distress. Breath sounds: Normal breath sounds. No wheezing or rales. Abdominal:      General: There is no distension. Palpations: Abdomen is soft. Tenderness: There is no abdominal tenderness. There is no guarding. Skin:     General: Skin is warm. Neurological:      Mental Status: He is alert and oriented to person, place, and time. DIFFERENTIAL  DIAGNOSIS     PLAN (LABS / IMAGING / EKG):  Orders Placed This Encounter   Procedures    XR CHEST PORTABLE    Troponin    CBC WITH AUTO DIFFERENTIAL    COMPREHENSIVE METABOLIC PANEL    LIPASE    EKG 12 Lead       MEDICATIONS ORDERED:  Orders Placed This Encounter   Medications    ketorolac (TORADOL) injection 30 mg           Initial MDM/Plan: 41 y.o. male who presents with right-sided abdominal pain/flank pain.   Patient has stable vital signs on arrival.  On physical exam, his abdomen soft, nondistended nontender. No chest wall crepitus no external signs of trauma. Lungs were clear to auscultation bilaterally. Low suspicion for acute abdominal pathology. No imaging required at this time at this patient had a CT scan on 10/07. Plan for basic lab work will get a troponin given the patient's age. Although low suspicion for ACS. DIAGNOSTIC RESULTS / EMERGENCYDEPARTMENT COURSE / MDM     LABS:  Labs Reviewed   TROPONIN   CBC WITH AUTO DIFFERENTIAL   COMPREHENSIVE METABOLIC PANEL   LIPASE         RADIOLOGY:  Xr Chest Portable    Result Date: 11/2/2020  EXAMINATION: ONE XRAY VIEW OF THE CHEST 11/2/2020 2:28 pm COMPARISON: 08/21/2015 HISTORY: Reason for Exam: right sided pain FINDINGS: The lungs are without acute focal process. No effusion or pneumothorax. The cardiomediastinal silhouette is normal.  The osseous structures are intact without acute process. Negative chest.         EKG    [place] [facility]      All EKG's are interpreted by the Emergency Department Physicianwho either signs or Co-signs this chart in the absence of a cardiologist.    EMERGENCY DEPARTMENT COURSE:      Cardiac work-up negative. The patient was given Toradol for symptoms. He was given strict return precautions. Instructed to follow-up with his primary care physician. Patient reported that he was attempting to establish PCP now and would make an appointment. All questions answered. PROCEDURES:  None    CONSULTS:  None    CRITICAL CARE:  Please see attending note    FINAL IMPRESSION      1.  Right upper quadrant abdominal pain          DISPOSITION / PLAN     DISPOSITION Decision To Discharge 11/02/2020 03:01:56 PM      PATIENT REFERRED TO:  [facility]  [address] [place] [address]  [phone]  Go to   If symptoms worsen      DISCHARGE MEDICATIONS:  Discharge Medication List as of 11/2/2020  3:12 PM          Manjinder Soria MD  Emergency Medicine Resident    (Please note that portions of this note were completed with a voice recognition program.Efforts were made to edit the dictations but occasionally words are mis-transcribed.)        Jean August MD  Resident  11/02/20 3657

## 2020-11-02 NOTE — ED PROVIDER NOTES
[facility] ED     Emergency Department     Faculty Attestation    I performed a history and physical examination of the patient and discussed management with the resident. I reviewed the residents note and agree with the documented findings and plan of care. Any areas of disagreement are noted on the chart. I was personally present for the key portions of any procedures. I have documented in the chart those procedures where I was not present during the key portions. I have reviewed the emergency nurses triage note. I agree with the chief complaint, past medical history, past surgical history, allergies, medications, social and family history as documented unless otherwise noted below. For Physician Assistant/ Nurse Practitioner cases/documentation I have personally evaluated this patient and have completed at least one if not all key elements of the E/M (history, physical exam, and MDM). Additional findings are as noted. This patient was evaluated in the Emergency Department for symptoms described in the history of present illness. He/she was evaluated in the context of the global COVID-19 pandemic, which necessitated consideration that the patient might be at risk for infection with the SARS-CoV-2 virus that causes COVID-19. Institutional protocols and algorithms that pertain to the evaluation of patients at risk for COVID-19 are in a state of rapid change based on information released by regulatory bodies including the CDC and federal and state organizations. These policies and algorithms were followed during the patient's care in the ED. Patient here with right sided pain flank pain ongoing for last month. States he was at [facility] several weeks ago had a CT that showed \"colitis\" but was not discharged home with any antibiotics.   Did have some trace blood in stools for several days after that has resolved normal bowel movement since without blood or black tarry stools no fever no vomiting. No urinary complaints no testicular pain. No injury or trauma. 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 On exam well-appearing nontoxic playing on his phone.   Mild right flank pain no rebound or guarding no Whalen sign no right lower McBurney's tenderness no rebound no guarding will check labs, probable discharge      Critical Care     none    Suzie Shaffer MD, [facility]  Attending Emergency  Physician             Suzie Shaffer MD  11/02/20 0445

## 2020-11-02 NOTE — ED TRIAGE NOTES
Pt C/O right side pain and a \"swollen feeling\" x 3 wks. Pt was evaluated at [facility] for the same and treated with Bentyl in the ER, which he states helped. Pt was unable to fill Bentyl script. Pt denies recent trauma and also denies constipation and urinary symptoms. No N/V/D.

## 2020-11-03 LAB
EKG ATRIAL RATE: 86 BPM
EKG P AXIS: 52 DEGREES
EKG P-R INTERVAL: 142 MS
EKG Q-T INTERVAL: 384 MS
EKG QRS DURATION: 78 MS
EKG QTC CALCULATION (BAZETT): 459 MS
EKG R AXIS: -4 DEGREES
EKG T AXIS: 33 DEGREES
EKG VENTRICULAR RATE: 86 BPM

## 2020-11-03 PROCEDURE — 93010 ELECTROCARDIOGRAM REPORT: CPT | Performed by: INTERNAL MEDICINE

## 2021-06-08 ENCOUNTER — HOSPITAL ENCOUNTER (EMERGENCY)
Age: 41
Discharge: HOME | End: 2021-06-08
Attending: EMERGENCY MEDICINE
Payer: COMMERCIAL

## 2021-06-08 VITALS
HEIGHT: 74 IN | SYSTOLIC BLOOD PRESSURE: 141 MMHG | HEART RATE: 89 BPM | RESPIRATION RATE: 16 BRPM | BODY MASS INDEX: 38.5 KG/M2 | TEMPERATURE: 98.6 F | WEIGHT: 300 LBS | DIASTOLIC BLOOD PRESSURE: 87 MMHG | OXYGEN SATURATION: 99 %

## 2021-06-08 DIAGNOSIS — M54.31 SCIATICA OF RIGHT SIDE: Primary | ICD-10-CM

## 2021-06-08 PROCEDURE — 6370000000 HC RX 637 (ALT 250 FOR IP): Performed by: STUDENT IN AN ORGANIZED HEALTH CARE EDUCATION/TRAINING PROGRAM

## 2021-06-08 PROCEDURE — 96372 THER/PROPH/DIAG INJ SC/IM: CPT

## 2021-06-08 PROCEDURE — 99284 EMERGENCY DEPT VISIT MOD MDM: CPT

## 2021-06-08 PROCEDURE — 6360000002 HC RX W HCPCS: Performed by: STUDENT IN AN ORGANIZED HEALTH CARE EDUCATION/TRAINING PROGRAM

## 2021-06-08 RX ORDER — LIDOCAINE 50 MG/G
1 PATCH TOPICAL DAILY
Qty: 10 PATCH | Refills: 0 | Status: SHIPPED | OUTPATIENT
Start: 2021-06-08 | End: 2021-06-18

## 2021-06-08 RX ORDER — IBUPROFEN 800 MG/1
800 TABLET ORAL 2 TIMES DAILY PRN
Qty: 30 TABLET | Refills: 0 | Status: SHIPPED | OUTPATIENT
Start: 2021-06-08 | End: 2021-06-28

## 2021-06-08 RX ORDER — CYCLOBENZAPRINE HCL 10 MG
10 TABLET ORAL 3 TIMES DAILY PRN
Qty: 21 TABLET | Refills: 0 | Status: SHIPPED | OUTPATIENT
Start: 2021-06-08 | End: 2021-06-18

## 2021-06-08 RX ORDER — IBUPROFEN 800 MG/1
800 TABLET ORAL ONCE
Status: COMPLETED | OUTPATIENT
Start: 2021-06-08 | End: 2021-06-08

## 2021-06-08 RX ORDER — LIDOCAINE 4 G/G
1 PATCH TOPICAL DAILY
Status: DISCONTINUED | OUTPATIENT
Start: 2021-06-08 | End: 2021-06-08 | Stop reason: HOSPADM

## 2021-06-08 RX ORDER — ORPHENADRINE CITRATE 30 MG/ML
60 INJECTION INTRAMUSCULAR; INTRAVENOUS ONCE
Status: COMPLETED | OUTPATIENT
Start: 2021-06-08 | End: 2021-06-08

## 2021-06-08 RX ADMIN — ORPHENADRINE CITRATE 60 MG: 30 INJECTION INTRAMUSCULAR; INTRAVENOUS at 09:51

## 2021-06-08 RX ADMIN — IBUPROFEN 800 MG: 800 TABLET, FILM COATED ORAL at 09:50

## 2021-06-08 ASSESSMENT — ENCOUNTER SYMPTOMS
NAUSEA: 0
PHOTOPHOBIA: 0
BACK PAIN: 0
ABDOMINAL PAIN: 0
SHORTNESS OF BREATH: 0
VOMITING: 0

## 2021-06-08 ASSESSMENT — PAIN DESCRIPTION - ORIENTATION: ORIENTATION: LOWER

## 2021-06-08 ASSESSMENT — PAIN DESCRIPTION - PAIN TYPE: TYPE: ACUTE PAIN

## 2021-06-08 ASSESSMENT — PAIN SCALES - GENERAL
PAINLEVEL_OUTOF10: 8
PAINLEVEL_OUTOF10: 8

## 2021-06-08 ASSESSMENT — PAIN DESCRIPTION - LOCATION: LOCATION: BACK

## 2021-06-08 NOTE — ED PROVIDER NOTES
[facility] ED  Emergency Department Encounter  EmergencyMedicine Resident     Pt Name:Drew Crane  MRN: [id number]  [place] 1980  Date of evaluation: 6/8/21  PCP:  No primary care provider on file. CHIEF COMPLAINT       Chief Complaint   Patient presents with    Back Pain     lower radiates down right leg        HISTORY OF PRESENT ILLNESS  (Location/Symptom, Timing/Onset, Context/Setting, Quality, Duration, Modifying Factors, Severity.)      Deisi Ward is a 35 y.o. male who presents with pain down the back of his right leg. Patient reports that pain has been ongoing intermittently for past several days after starting a new job in which she stands up most of the day. Patient states that pain is alleviated by lying down and staying off his feet, made worse by walking around. Reports the pain feels as if it starts in his right hip and radiates down the back of his right leg. Describes it as a burning, stabbing character. Patient states he has been taking Motrin at home with minimal relief. He denies any recent injury, prior back surgeries, injections in the back, anticoagulant use, associated fever, chills, rash, neck stiffness, neck rigidity, headache, vision changes. PAST MEDICAL / SURGICAL / SOCIAL / FAMILY HISTORY      has a past medical history of DVT, lower extremity ([address] Hypertension, Obesity, PE (pulmonary embolism), Post-thrombotic syndrome, Psychiatric problem, and Unspecified diseases of blood and blood-forming organs. has a past surgical history that includes Vena Cava Filter Placement.       Social History     Socioeconomic History    Marital status: Single     Spouse name: Not on file    Number of children: Not on file    Years of education: Not on file    Highest education level: Not on file   Occupational History    Not on file   Tobacco Use    Smoking status: Former Smoker     Packs/day: 0.25     Years: 10.00     Pack years: 2.50     Types: Cigarettes    Smokeless tobacco: Never Used   Substance and Sexual Activity    Alcohol use: Yes     Comment: occas    Drug use: No    Sexual activity: Not on file   Other Topics Concern    Not on file   Social History Narrative    Not on file     Social Determinants of Health     Financial Resource Strain:     Difficulty of Paying Living Expenses:    Food Insecurity:     Worried About Running Out of Food in the Last Year:     [address] in the Last Year:    Transportation Needs:     Lack of Transportation (Medical):  Lack of Transportation (Non-Medical):    Physical Activity:     Days of Exercise per Week:     Minutes of Exercise per Session:    Stress:     Feeling of Stress :    Social Connections:     Frequency of Communication with Friends and Family:     Frequency of Social Gatherings with Friends and Family:     Attends Taoist Services:     Active Member of Clubs or Organizations:     Attends Club or Organization Meetings:     Marital Status:    Intimate Partner Violence:     Fear of Current or Ex-Partner:     Emotionally Abused:     Physically Abused:     Sexually Abused:        Family History   Problem Relation Age of Onset    Diabetes Mother     Colon Cancer Mother        Allergies:  Patient has no known allergies. Home Medications:  Prior to Admission medications    Medication Sig Start Date End Date Taking?  Authorizing Provider   lidocaine (LIDODERM) 5 % Place 1 patch onto the skin daily for 10 days 12 hours on, 12 hours off. 6/8/21 6/18/21 Yes Cori Maldonado DO   cyclobenzaprine (FLEXERIL) 10 MG tablet Take 1 tablet by mouth 3 times daily as needed for Muscle spasms 6/8/21 6/18/21 Yes Cori Maldonado DO   ibuprofen (ADVIL;MOTRIN) 800 MG tablet Take 1 tablet by mouth 2 times daily as needed for Pain 6/8/21  Yes Cori Maldonado DO   ibuprofen (ADVIL;MOTRIN) 600 MG tablet Take 1 tablet by mouth every 6 hours as needed for Pain Take with food 10/27/19   Lilli Conway Digioia, DO   acetaminophen (TYLENOL) 325 MG tablet Take 2 tablets by mouth every 8 hours as needed for Pain 4/29/19   Katelyn Hamlin MD   colchicine (COLCRYS) 0.6 MG tablet Take 1 tablet by mouth daily 4/29/19   Katelyn Hamlin MD   naproxen (NAPROSYN) 250 MG tablet Take 2 tablets by mouth 2 times daily (with meals) 12/14/18   Deidre Razor, DO       REVIEW OF SYSTEMS    (2-9 systems for level 4, 10 or more for level 5)      Review of Systems   Constitutional: Negative for fatigue and fever. Eyes: Negative for photophobia and visual disturbance. Respiratory: Negative for shortness of breath. Cardiovascular: Negative for chest pain. Gastrointestinal: Negative for abdominal pain, nausea and vomiting. Endocrine: Negative for polyuria. Genitourinary: Negative for dysuria and flank pain. Musculoskeletal: Positive for myalgias (Pain in back of right leg). Negative for back pain, neck pain and neck stiffness. Skin: Negative for rash and wound. Neurological: Negative for light-headedness and headaches. Psychiatric/Behavioral: Negative for confusion. PHYSICAL EXAM   (up to 7 for level 4, 8 or more for level 5)      INITIAL VITALS:   BP (!) 141/87   Pulse 89   Temp 98.6 °F (37 °C) (Oral)   Resp 16   Ht 6' 2\" (1.88 m)   Wt 300 lb (136.1 kg)   SpO2 99%   BMI 38.52 kg/m²     Physical Exam  Constitutional:       General: He is not in acute distress. Appearance: He is not toxic-appearing. HENT:      Head: Normocephalic and atraumatic. Cardiovascular:      Rate and Rhythm: Normal rate. Pulmonary:      Effort: Pulmonary effort is normal.   Musculoskeletal:         General: No swelling, tenderness or deformity. Cervical back: No rigidity or tenderness. Neurological:      Mental Status: He is alert. Sensory: No sensory deficit. Motor: No weakness.       Gait: Gait normal.      Comments: Negative straight leg raise test.         DIFFERENTIAL  DIAGNOSIS     PLAN (LABS / AM      PATIENT REFERRED TO:  [address] [address] [phone]  In 1 week  For re-evaluation      DISCHARGE MEDICATIONS:  New Prescriptions    CYCLOBENZAPRINE (FLEXERIL) 10 MG TABLET    Take 1 tablet by mouth 3 times daily as needed for Muscle spasms    IBUPROFEN (ADVIL;MOTRIN) 800 MG TABLET    Take 1 tablet by mouth 2 times daily as needed for Pain    LIDOCAINE (LIDODERM) 5 %    Place 1 patch onto the skin daily for 10 days 12 hours on, 12 hours off.        Jose Fairchild DO  Emergency Medicine Resident    (Please note that portions of thisnote were completed with a voice recognition program.  Efforts were made to edit the dictations but occasionally words are mis-transcribed.)        Jose Fairchild DO  Resident  06/08/21 1024

## 2021-06-08 NOTE — ED TRIAGE NOTES
Pt arrived to the ED with c/o lower back pain that radiates down his right leg. Pt states this has been going on for a month ever since he went back to work. Pt is alert and oriented x4. VSS.

## 2021-06-08 NOTE — ED PROVIDER NOTES
[facility]     Emergency Department     Faculty Note/ Attestation      Pt Name: Deisi Ward                                       MRN: [id number]  [place] 1980  Date of evaluation: 6/8/2021  Patients PCP:    No primary care provider on file. Attestation  I performed a history and physical examination of the patient/ or directly observed  and discussed management with the resident. I reviewed the residents note and agree with the documented findings and plan of care. Any areas of disagreement are noted on the chart. I was personally present for the key portions of any procedures. I have documented in the chart those procedures where I was not present during the key portions. I have reviewed the emergency nurses triage note. I agree with the chief complaint, past medical history, past surgical history, allergies, medications, social and family history as documented unless otherwise noted below. For Physician Assistant/ Nurse Practitioner cases/documentation I have personally evaluated this patient and have completed at least one if not all key elements of the E/M (history, physical exam, and MDM). Additional findings are as noted. This patient was evaluated in the Emergency Department for symptoms described in the history of present illness. The patient was evaluated in the context of the global COVID-19 pandemic, which necessitated consideration that the patient might be at risk for infection with the SARS-CoV-2 virus that causes COVID-19. Institutional protocols and algorithms that pertain to the evaluation of patients at risk for COVID-19 are in a state of rapid change based on information released by regulatory bodies including the CDC and federal and state organizations. These policies and algorithms were followed during the patient's care in the ED.      Initial Screens:        Sisters Coma Scale  Eye Opening: Spontaneous  Best Verbal Response: Oriented  Best Motor

## 2021-06-28 ENCOUNTER — APPOINTMENT (OUTPATIENT)
Dept: GENERAL RADIOLOGY | Age: 41
End: 2021-06-28
Payer: COMMERCIAL

## 2021-06-28 ENCOUNTER — HOSPITAL ENCOUNTER (EMERGENCY)
Age: 41
Discharge: HOME | End: 2021-06-28
Attending: EMERGENCY MEDICINE
Payer: COMMERCIAL

## 2021-06-28 VITALS
OXYGEN SATURATION: 97 % | DIASTOLIC BLOOD PRESSURE: 106 MMHG | WEIGHT: 300 LBS | TEMPERATURE: 99 F | HEART RATE: 110 BPM | SYSTOLIC BLOOD PRESSURE: 182 MMHG | RESPIRATION RATE: 16 BRPM | BODY MASS INDEX: 38.52 KG/M2

## 2021-06-28 DIAGNOSIS — S93.601A SPRAIN OF RIGHT FOOT, INITIAL ENCOUNTER: Primary | ICD-10-CM

## 2021-06-28 PROCEDURE — 73630 X-RAY EXAM OF FOOT: CPT

## 2021-06-28 PROCEDURE — 99283 EMERGENCY DEPT VISIT LOW MDM: CPT

## 2021-06-28 PROCEDURE — 73610 X-RAY EXAM OF ANKLE: CPT

## 2021-06-28 RX ORDER — IBUPROFEN 800 MG/1
800 TABLET ORAL EVERY 6 HOURS PRN
Qty: 21 TABLET | Refills: 0 | Status: SHIPPED | OUTPATIENT
Start: 2021-06-28 | End: 2021-08-26

## 2021-06-28 ASSESSMENT — PAIN DESCRIPTION - ORIENTATION: ORIENTATION: RIGHT

## 2021-06-28 ASSESSMENT — PAIN DESCRIPTION - LOCATION: LOCATION: ANKLE

## 2021-06-28 ASSESSMENT — PAIN DESCRIPTION - PROGRESSION: CLINICAL_PROGRESSION: GRADUALLY WORSENING

## 2021-06-28 ASSESSMENT — PAIN DESCRIPTION - FREQUENCY: FREQUENCY: INTERMITTENT

## 2021-06-28 ASSESSMENT — PAIN SCALES - GENERAL: PAINLEVEL_OUTOF10: 5

## 2021-06-28 ASSESSMENT — PAIN - FUNCTIONAL ASSESSMENT: PAIN_FUNCTIONAL_ASSESSMENT: PREVENTS OR INTERFERES SOME ACTIVE ACTIVITIES AND ADLS

## 2021-06-28 ASSESSMENT — PAIN DESCRIPTION - ONSET: ONSET: SUDDEN

## 2021-06-28 ASSESSMENT — PAIN DESCRIPTION - PAIN TYPE: TYPE: ACUTE PAIN

## 2021-06-28 NOTE — ED PROVIDER NOTES
[facility]     Emergency Department     Faculty Attestation    I performed a history and physical examination of the patient and discussed management with the resident. I have reviewed and agree with the residents findings including all diagnostic interpretations, and treatment plans as written. Any areas of disagreement are noted on the chart. I was personally present for the key portions of any procedures. I have documented in the chart those procedures where I was not present during the key portions. I have reviewed the emergency nurses triage note. I agree with the chief complaint, past medical history, past surgical history, allergies, medications, social and family history as documented unless otherwise noted below. Documentation of the HPI, Physical Exam and Medical Decision Making performed by sharifa is based on my personal performance of the HPI, PE and MDM. For Physician Assistant/ Nurse Practitioner cases/documentation I have personally evaluated this patient and have completed at least one if not all key elements of the E/M (history, physical exam, and MDM). Additional findings are as noted. Stepped in a pothole 4 days ago, now having pain and swelling to his right ankle. Patient not on any anticoagulation. It does have a history of gout but states this feels differently. He is able to ambulate but has a limp. Patient has swelling to his right lateral malleolus and pain with plantar flexion and dorsiflexion but able to perform with full range of motion. 2+ pedal pulse, sensation to light touch intact. No edema, no erythema no increased warmth. Patient with ankle sprain, will plan on x-ray imaging, ice NSAIDs elevation Ace wrap, crutches.   Follow-up    Demetrio Damon D.O, M.P.H  Attending Emergency Medicine Physician         Demetrio Damon DO  06/28/21 3657

## 2021-06-28 NOTE — ED NOTES
Pt brought back to room 5 via wheelchair. Pt with difficulty ambulating/bearing weight on lower extremity due to pain.       Geetha Jorgensen RN  06/28/21 0004

## 2021-06-28 NOTE — ED PROVIDER NOTES
[facility]  ED  Emergency Department Encounter  EmergencyMedicine Resident     Pt Name:Drew Diaz Si  MRN: [id number]  [place] 1980  Date of evaluation: 6/28/21  PCP:  No primary care provider on file. CHIEF COMPLAINT       Chief Complaint   Patient presents with    Ankle Pain     R ankle pain x 1 day       HISTORY OF PRESENT ILLNESS  (Location/Symptom, Timing/Onset, Context/Setting, Quality, Duration, Modifying Factors, Severity.)      Shady Amador is a 35 y.o. male who presents with complaints of right ankle pain. Patient states that several days ago he stepped into a pothole and inverted his right ankle. States he has been ambulatory without discomfort, however this morning he states that the ankle is more painful and swollen. He also has a history of gout, however states that with his prior gout flares he usually has allodynia which he does not report having today. PAST MEDICAL / SURGICAL / SOCIAL / FAMILY HISTORY      has a past medical history of DVT, lower extremity ([address] Hypertension, Obesity, PE (pulmonary embolism), Post-thrombotic syndrome, Psychiatric problem, and Unspecified diseases of blood and blood-forming organs. Denies further past medical hx     has a past surgical history that includes Vena Cava Filter Placement.   Denies further past surgical hx    Social History     Socioeconomic History    Marital status: Single     Spouse name: Not on file    Number of children: Not on file    Years of education: Not on file    Highest education level: Not on file   Occupational History    Not on file   Tobacco Use    Smoking status: Former Smoker     Packs/day: 0.25     Years: 10.00     Pack years: 2.50     Types: Cigarettes    Smokeless tobacco: Never Used   Substance and Sexual Activity    Alcohol use: Yes     Comment: occas    Drug use: No    Sexual activity: Not on file   Other Topics Concern    Not on file   Social History Narrative    Not on file sore throat. Eyes: Negative for pain. Respiratory: Negative for cough. Cardiovascular: Negative for chest pain and palpitations. Gastrointestinal: Negative for abdominal pain, nausea and vomiting. Genitourinary: Negative for dysuria. Musculoskeletal: Positive for right ankle pain  Skin: Negative for wound. Neurological: Negative for headaches. PHYSICAL EXAM   (up to 7 for level 4, 8 or more for level 5)      INITIAL VITALS:   BP (!) 182/106   Pulse 110   Temp 99 °F (37.2 °C)   Resp 16   Wt 300 lb (136.1 kg)   SpO2 97%   BMI 38.52 kg/m²     Physical Exam   Gen. Appearance: patient appears well, nondistressed. Head: head atraumatic, normocephalic. Eyes: Extraocular movements intact. No scleral icterus  Mouth: Oropharynx clear and moist.  No oral lesions  Neck: Supple. No lymphadenopathy. Pulmonary: Lungs clear to auscultation bilaterally. No wheezing, rales or rhonchi   Cardiovascular: Regular rate and rhythm, no murmurs   Abdomen: Soft, nontender, no guarding or rebound, normal bowel sounds  Neurology: GCS 15. Oriented to person place and time. moving all extremities   Skin: Warm, dry, well perfused  MSK: Moderate swelling over the lateral aspect of the right ankle. There is tenderness overlying the distal fibula and base of the fifth metatarsal      DIFFERENTIAL  DIAGNOSIS     PLAN (LABS / IMAGING / EKG):  Orders Placed This Encounter   Procedures    XR ANKLE RIGHT (MIN 3 VIEWS)    XR FOOT RIGHT (MIN 3 VIEWS)       MEDICATIONS ORDERED:  No orders of the defined types were placed in this encounter. DIAGNOSTIC RESULTS / EMERGENCY DEPARTMENT COURSE / MDM     LABS:  No results found for this visit on 06/28/21.     RADIOLOGY:  XR ANKLE RIGHT (MIN 3 VIEWS)    (Results Pending)   XR FOOT RIGHT (MIN 3 VIEWS)    (Results Pending)         EKG  None    All EKG's are interpreted by the Emergency Department Physician who either signs or Co-signs this chart in the absence of a cardiologist.    [address] [place] [address] Hector Severino MDM:  35 y.o. male who presents with right ankle pain following inversion sprain. X-rays are unremarkable. Will provide crutches and outpatient follow-up. Area is not warm to the touch, doubt gouty flare, however NSAIDs will be given               PROCEDURES:  None    CONSULTS:  None    CRITICAL CARE:  None    FINAL IMPRESSION      1. Sprain of right foot, initial encounter              DISPOSITION / PLAN     DISPOSITION        PATIENT REFERRED TO:  No follow-up provider specified.     DISCHARGE MEDICATIONS:  New Prescriptions    No medications on file       Sonia Mc DO  Emergency Medicine Resident    (Please note that portions of thisnote were completed with a voice recognition program.  Efforts were made to edit the dictations but occasionally words are mis-transcribed.)        Sonia Mc DO  Resident  06/28/21 [id number]

## 2021-07-03 ENCOUNTER — HOSPITAL ENCOUNTER (EMERGENCY)
Age: 41
Discharge: HOME | End: 2021-07-03
Attending: EMERGENCY MEDICINE
Payer: COMMERCIAL

## 2021-07-03 VITALS
TEMPERATURE: 97.8 F | RESPIRATION RATE: 17 BRPM | SYSTOLIC BLOOD PRESSURE: 158 MMHG | DIASTOLIC BLOOD PRESSURE: 99 MMHG | OXYGEN SATURATION: 98 % | HEART RATE: 103 BPM

## 2021-07-03 DIAGNOSIS — M25.571 ACUTE RIGHT ANKLE PAIN: Primary | ICD-10-CM

## 2021-07-03 PROCEDURE — 99282 EMERGENCY DEPT VISIT SF MDM: CPT

## 2021-07-03 PROCEDURE — 6360000002 HC RX W HCPCS: Performed by: STUDENT IN AN ORGANIZED HEALTH CARE EDUCATION/TRAINING PROGRAM

## 2021-07-03 PROCEDURE — 96372 THER/PROPH/DIAG INJ SC/IM: CPT

## 2021-07-03 RX ORDER — KETOROLAC TROMETHAMINE 30 MG/ML
30 INJECTION, SOLUTION INTRAMUSCULAR; INTRAVENOUS ONCE
Status: COMPLETED | OUTPATIENT
Start: 2021-07-03 | End: 2021-07-03

## 2021-07-03 RX ADMIN — KETOROLAC TROMETHAMINE 30 MG: 30 INJECTION, SOLUTION INTRAMUSCULAR; INTRAVENOUS at 19:03

## 2021-07-03 ASSESSMENT — PAIN SCALES - GENERAL
PAINLEVEL_OUTOF10: 5
PAINLEVEL_OUTOF10: 5

## 2021-07-03 ASSESSMENT — ENCOUNTER SYMPTOMS: SHORTNESS OF BREATH: 0

## 2021-07-03 NOTE — ED PROVIDER NOTES
[facility]     Emergency Department     Faculty Attestation    I performed a history and physical examination of the patient and discussed management with the resident. I reviewed the residents note and agree with the documented findings and plan of care. Any areas of disagreement are noted on the chart. I was personally present for the key portions of any procedures. I have documented in the chart those procedures where I was not present during the key portions. I have reviewed the emergency nurses triage note. I agree with the chief complaint, past medical history, past surgical history, allergies, medications, social and family history as documented unless otherwise noted below. For Physician Assistant/ Nurse Practitioner cases/documentation I have personally evaluated this patient and have completed at least one if not all key elements of the E/M (history, physical exam, and MDM). Additional findings are as noted. Patient presents with right ankle pain and swelling. Patient was seen here about 6 days ago and diagnosed with an ankle sprain after he twisted it a couple of days before that. Patient had unremarkable x-rays done at that time. Patient says that the pain seems to be getting better but the swelling is worsening. He denies any new injuries. He denies fever, chills, chest pain or shortness of breath. He denies any pain or swelling to his calf. On exam, patient is sitting on the side the bed and appears well. There is moderate edema to the right ankle and foot. No erythema or warmth. There is mild tenderness. There is no tenderness or edema to the calf. I do not feel that additional imaging is indicated at this time. Will instruct patient to keep ankle elevated when able and will treat patient's pain.       Juliana Serra MD  Attending Emergency  Physician              Maximus Cintron MD  07/03/21 0369

## 2021-07-03 NOTE — ED NOTES
Pt. Arrives via walk in for increased swelling to right ankle. Pt. Was treated at this hospital earlier this week for the same and states his symptoms are not improving.        Sunday MAITE Travis  07/03/21 1274

## 2021-07-03 NOTE — ED PROVIDER NOTES
[facility] ED  Emergency Department Encounter  EmergencyMedicine Resident     Pt Name:Drew Martinez  MRN: [id number]  [place] 1980  Date of evaluation: 7/3/21  PCP:  No primary care provider on file. This patient was evaluated in the Emergency Department for symptoms described in the history of present illness. The patient was evaluated in the context of the global COVID-19 pandemic, which necessitated consideration that the patient might be at risk for infection with the SARS-CoV-2 virus that causes COVID-19. Institutional protocols and algorithms that pertain to the evaluation of patients at risk for COVID-19 are in a state of rapid change based on information released by regulatory bodies including the CDC and federal and state organizations. These policies and algorithms were followed during the patient's care in the ED. CHIEF COMPLAINT       Chief Complaint   Patient presents with    Ankle Pain     RLE, seen monday       HISTORY OF PRESENT ILLNESS  (Location/Symptom, Timing/Onset, Context/Setting, Quality, Duration, Modifying Factors, Severity.)      Ted [place] is a 35 y.o. male who presents with right ankle swelling and pain. Past medical history is significant for gout and DVT in 2015. States that this does not feel like gout or DVT. Patient was recently seen on 6/28/21 for right ankle swelling and pain. Right ankle and foot x-rays were done at that time showing soft tissue swelling but no acute fracture. Patient returns today due to swelling and pain not improving. Injury originally occurred when patient inverted ankle in a pothole 2 weeks ago. States that he has been compressing it elevating it and icing it at home however he does admit to taking the compressive sock off occasionally. Past medical history is significant for gout for which he takes colchicine however this does not feel like a gout flare to him.   Denies any fever, chills, leg swelling, loss of sensation, or calf pain. PAST MEDICAL / SURGICAL / SOCIAL / FAMILY HISTORY      has a past medical history of DVT, lower extremity ([address] Hypertension, Obesity, PE (pulmonary embolism), Post-thrombotic syndrome, Psychiatric problem, and Unspecified diseases of blood and blood-forming organs. has a past surgical history that includes Vena Cava Filter Placement. Social History     Socioeconomic History    Marital status: Single     Spouse name: Not on file    Number of children: Not on file    Years of education: Not on file    Highest education level: Not on file   Occupational History    Not on file   Tobacco Use    Smoking status: Former Smoker     Packs/day: 0.25     Years: 10.00     Pack years: 2.50     Types: Cigarettes    Smokeless tobacco: Never Used   Substance and Sexual Activity    Alcohol use: Yes     Comment: occas    Drug use: No    Sexual activity: Not on file   Other Topics Concern    Not on file   Social History Narrative    Not on file     Social Determinants of Health     Financial Resource Strain:     Difficulty of Paying Living Expenses:    Food Insecurity:     Worried About Running Out of Food in the Last Year:     [address] in the Last Year:    Transportation Needs:     Lack of Transportation (Medical):      Lack of Transportation (Non-Medical):    Physical Activity:     Days of Exercise per Week:     Minutes of Exercise per Session:    Stress:     Feeling of Stress :    Social Connections:     Frequency of Communication with Friends and Family:     Frequency of Social Gatherings with Friends and Family:     Attends [employer] Services:     Active Member of Clubs or Organizations:     Attends Club or Organization Meetings:     Marital Status:    Intimate Partner Violence:     Fear of Current or Ex-Partner:     Emotionally Abused:     Physically Abused:     Sexually Abused:        Family History   Problem Relation Age of Onset    Diabetes Mother  Colon Cancer Mother        Allergies:  Patient has no known allergies. Home Medications:  Prior to Admission medications    Medication Sig Start Date End Date Taking? Authorizing Provider   ibuprofen (IBU) 800 MG tablet Take 1 tablet by mouth every 6 hours as needed for Pain 6/28/21   Sonia Mc DO   acetaminophen (TYLENOL) 325 MG tablet Take 2 tablets by mouth every 8 hours as needed for Pain 4/29/19   Mary Kay Longoria MD   colchicine (COLCRYS) 0.6 MG tablet Take 1 tablet by mouth daily 4/29/19   Mary Kay Longoria MD   naproxen (NAPROSYN) 250 MG tablet Take 2 tablets by mouth 2 times daily (with meals) 12/14/18   Nik Escamilla, DO       REVIEW OF SYSTEMS    (2-9 systems for level 4, 10 or more for level 5)      Review of Systems   Constitutional: Negative for chills and fever. Respiratory: Negative for shortness of breath. Cardiovascular: Negative for chest pain. Musculoskeletal: Positive for joint swelling. PHYSICAL EXAM   (up to 7 for level 4, 8 or more for level 5)      INITIAL VITALS:   BP (!) 158/99   Pulse 103   Temp 97.8 °F (36.6 °C)   Resp 17   SpO2 98%     Physical Exam  Constitutional:       Comments: Vital signs show HTN and slight tachycardia but overall stable. Most likely due to pain with movement. Cardiovascular:      Rate and Rhythm: Normal rate. Heart sounds: No murmur heard. No friction rub. No gallop. Pulmonary:      Effort: No respiratory distress. Breath sounds: No wheezing or rhonchi. Musculoskeletal:      Right ankle: Swelling present. No ecchymosis. Normal range of motion. Normal pulse. Left ankle: No swelling. Normal pulse. Comments: Good pulses bilaterally. Swelling located on the ankle but does not extend up into the calf. DIFFERENTIAL  DIAGNOSIS     PLAN (LABS / IMAGING / EKG):  No orders of the defined types were placed in this encounter.       MEDICATIONS ORDERED:  Orders Placed This Encounter   Medications    ketorolac (TORADOL) injection 30 mg       DDX: Right ankle swelling vs fracture vs DVT    DIAGNOSTIC RESULTS / EMERGENCY DEPARTMENT COURSE / MDM   LAB RESULTS:  No results found for this visit on 07/03/21. IMPRESSION: Right ankle swelling                 EMERGENCY DEPARTMENT COURSE:  Patient is overall stable complaining of continued swelling and pain of the right ankle. Due to the fact that there is no increased in pain or swelling and that the patient has not been wearing his compression sock as advised. It was felt that the joint swelling was result of the acute ankle sprain seen on 6/28. There was no swelling of the right calf or pain to palpation making DVT highly unlikely. Due to recent visit is felt that we did not need to x-ray right ankle either. In order to control pain we gave him 30 mg of Toradol IM. Patient was given return precautions and advised to rest compress and elevate ankle. PROCEDURES:  none    CONSULTS:  None    CRITICAL CARE:  0 min    FINAL IMPRESSION      1. Acute right ankle pain          DISPOSITION / PLAN     DISPOSITION Decision To Discharge 07/03/2021 07:14:55 PM      PATIENT REFERRED TO:  No follow-up provider specified.     DISCHARGE MEDICATIONS:  Discharge Medication List as of 7/3/2021  7:04 PM          Sadaf Henderson DO  Emergency Medicine Resident    (Please note that portions of thisnote were completed with a voice recognition program.  Efforts were made to edit the dictations but occasionally words are mis-transcribed.)        Nicole Molina DO  Resident  07/03/21 7172

## 2021-07-03 NOTE — LETTER
[facility]  [address] [facility] [place], [place]  Phone: [phone]               July 8, 2021    Patient: Mariel Brown   YOB: 1980   Date of Visit: 7/3/2021       To Whom It May Concern:    Joon Carson was seen and treated in our emergency department on 7/3/2021. He arrived at [place] [address] on 7/8/2021 requesting a return to work note. The physicians who cared for him are not available at this time but upon reviewing his chart there was no indication that he required any time off. Therefore he was able to return to work as of 7/4/2021. Any additional time off or work restrictions would have to be given through his PCP.    Sincerely,       [facility]        :

## 2021-07-23 ENCOUNTER — HOSPITAL ENCOUNTER (EMERGENCY)
Age: 41
Discharge: LEFT BEFORE DISCHARGE | End: 2021-07-23
Payer: COMMERCIAL

## 2021-07-23 VITALS
TEMPERATURE: 98.8 F | RESPIRATION RATE: 20 BRPM | OXYGEN SATURATION: 97 % | SYSTOLIC BLOOD PRESSURE: 134 MMHG | DIASTOLIC BLOOD PRESSURE: 89 MMHG | HEART RATE: 103 BPM

## 2021-07-23 ASSESSMENT — PAIN DESCRIPTION - ORIENTATION: ORIENTATION: RIGHT

## 2021-07-23 ASSESSMENT — PAIN DESCRIPTION - DESCRIPTORS: DESCRIPTORS: ACHING

## 2021-07-23 ASSESSMENT — PAIN DESCRIPTION - LOCATION: LOCATION: ANKLE

## 2021-07-24 ENCOUNTER — HOSPITAL ENCOUNTER (EMERGENCY)
Age: 41
Discharge: HOME | End: 2021-07-24
Attending: EMERGENCY MEDICINE
Payer: COMMERCIAL

## 2021-07-24 ENCOUNTER — APPOINTMENT (OUTPATIENT)
Dept: GENERAL RADIOLOGY | Age: 41
End: 2021-07-24
Payer: COMMERCIAL

## 2021-07-24 VITALS
RESPIRATION RATE: 18 BRPM | DIASTOLIC BLOOD PRESSURE: 89 MMHG | HEART RATE: 104 BPM | TEMPERATURE: 99 F | OXYGEN SATURATION: 95 % | SYSTOLIC BLOOD PRESSURE: 138 MMHG

## 2021-07-24 DIAGNOSIS — M25.571 ACUTE RIGHT ANKLE PAIN: Primary | ICD-10-CM

## 2021-07-24 PROCEDURE — 73610 X-RAY EXAM OF ANKLE: CPT

## 2021-07-24 PROCEDURE — 99283 EMERGENCY DEPT VISIT LOW MDM: CPT

## 2021-07-24 PROCEDURE — 6370000000 HC RX 637 (ALT 250 FOR IP): Performed by: EMERGENCY MEDICINE

## 2021-07-24 RX ORDER — IBUPROFEN 800 MG/1
800 TABLET ORAL ONCE
Status: COMPLETED | OUTPATIENT
Start: 2021-07-24 | End: 2021-07-24

## 2021-07-24 RX ADMIN — IBUPROFEN 800 MG: 800 TABLET, FILM COATED ORAL at 11:13

## 2021-07-24 ASSESSMENT — ENCOUNTER SYMPTOMS
SHORTNESS OF BREATH: 0
CONSTIPATION: 0
DIARRHEA: 0
VOMITING: 0
SORE THROAT: 0
NAUSEA: 0
ABDOMINAL PAIN: 0

## 2021-07-24 ASSESSMENT — PAIN SCALES - GENERAL
PAINLEVEL_OUTOF10: 6
PAINLEVEL_OUTOF10: 6

## 2021-07-24 NOTE — ED PROVIDER NOTES
[facility]  ED     Emergency Department     Faculty Attestation    I performed a history and physical examination of the patient and discussed management with the resident. I reviewed the residents note and agree with the documented findings and plan of care. Any areas of disagreement are noted on the chart. I was personally present for the key portions of any procedures. I have documented in the chart those procedures where I was not present during the key portions. I have reviewed the emergency nurses triage note. I agree with the chief complaint, past medical history, past surgical history, allergies, medications, social and family history as documented unless otherwise noted below. For Physician Assistant/ Nurse Practitioner cases/documentation I have personally evaluated this patient and have completed at least one if not all key elements of the E/M (history, physical exam, and MDM). Additional findings are as noted. Patient presents with right ankle pain and swelling after he twisted it a few days ago when he was coming off of a bus. He denies falling to the ground. He has no other complaints or injuries. On exam, patient is sitting on the side the bed and appears well. There is tenderness and edema to the right medial ankle. We will get x-rays and treat patient's pain.       Matheus Hackett MD  Attending Emergency  Physician              Papo Sanchez MD  07/24/21 0850

## 2021-07-24 NOTE — ED NOTES
Pt arrived to ED alert and oriented x4. Pt c/o ankle pain. Pt reports that he was getting off a bus a few days ago when he rolled his ankle. Pt reports \"I felt it go to the side but it didn't bother me at first\". Pt has pain and swelling to right ankle. Pt denies taking medication for pain. Pt denies having been around anyone suspected to have COVID-19 or anyone that has been sick, denies recent travel outside the [facility] or [address] [place] [address] Rd[address]. RR even and unlabored. NAD noted. Whiteboard updated. Will continue with plan of care.      Nancy Estrella RN  07/24/21 6804

## 2021-07-24 NOTE — ED PROVIDER NOTES
[facility] ED  Emergency Department Encounter  EmergencyMedicine Resident     Pt Name:Drew Segovia  MRN: [id number]  [place] 1980  Date of evaluation: 7/24/21  PCP:  No primary care provider on file. CHIEF COMPLAINT       Chief Complaint   Patient presents with    Ankle Pain     right ankle        HISTORY OF PRESENT ILLNESS  (Location/Symptom, Timing/Onset, Context/Setting, Quality, Duration, Modifying Factors, Severity.)      Mariel Brown is a 35 y.o. male who presents to the emergency department with right-sided ankle pain. Patient believes he injured the ankle 3 days ago when he stepped down from a box on wet grass and inverted it. He did not feel a pop or crack at the time was able to bear weight afterwards but over the past few days has noticed worsening swelling to the lateral aspect of the right ankle as well as pain in that area. He does have a history of prior pain in that ankle and was seen last on the third here in the emergency department for similar pain. He was discharged with symptomatic management and it was believed that his pain was an exacerbation of the sprain on June 28. History of gout but no foot pain. No fever, chills, nausea vomiting, chest pain, shortness breath, abdominal pain, problems with urination or bowel movements, or numbness or tingling anywhere. PAST MEDICAL / SURGICAL / SOCIAL / FAMILY HISTORY      has a past medical history of DVT, lower extremity ([address] Hypertension, Obesity, PE (pulmonary embolism), Post-thrombotic syndrome, Psychiatric problem, and Unspecified diseases of blood and blood-forming organs. has a past surgical history that includes Vena Cava Filter Placement.     Social History     Socioeconomic History    Marital status: Single     Spouse name: Not on file    Number of children: Not on file    Years of education: Not on file    Highest education level: Not on file   Occupational History    Not on file   Tobacco Use    Smoking status: Former Smoker     Packs/day: 0.25     Years: 10.00     Pack years: 2.50     Types: Cigarettes    Smokeless tobacco: Never Used   Substance and Sexual Activity    Alcohol use: Yes     Comment: occas    Drug use: No    Sexual activity: Not on file   Other Topics Concern    Not on file   Social History Narrative    Not on file     Social Determinants of Health     Financial Resource Strain:     Difficulty of Paying Living Expenses:    Food Insecurity:     Worried About Running Out of Food in the Last Year:     [address] in the Last Year:    Transportation Needs:     Lack of Transportation (Medical):  Lack of Transportation (Non-Medical):    Physical Activity:     Days of Exercise per Week:     Minutes of Exercise per Session:    Stress:     Feeling of Stress :    Social Connections:     Frequency of Communication with Friends and Family:     Frequency of Social Gatherings with Friends and Family:     Attends Rastafarian Services:     Active Member of Clubs or Organizations:     Attends Club or Organization Meetings:     Marital Status:    Intimate Partner Violence:     Fear of Current or Ex-Partner:     Emotionally Abused:     Physically Abused:     Sexually Abused:        Family History   Problem Relation Age of Onset    Diabetes Mother     Colon Cancer Mother        Allergies:  Patient has no known allergies. Home Medications:  Prior to Admission medications    Medication Sig Start Date End Date Taking?  Authorizing Provider   ibuprofen (IBU) 800 MG tablet Take 1 tablet by mouth every 6 hours as needed for Pain 6/28/21   Mary Grace Ortega DO   acetaminophen (TYLENOL) 325 MG tablet Take 2 tablets by mouth every 8 hours as needed for Pain 4/29/19   Candido Willett MD   colchicine (COLCRYS) 0.6 MG tablet Take 1 tablet by mouth daily 4/29/19   Candido Willett MD   naproxen (NAPROSYN) 250 MG tablet Take 2 tablets by mouth 2 times daily (with meals) 12/14/18   Raegan Nixon Estle Rumpf, DO       REVIEW OF SYSTEMS    (2-9 systems for level 4, 10 or more for level 5)      Review of Systems   Constitutional: Negative for chills and fever. HENT: Negative for ear pain, hearing loss and sore throat. Eyes: Negative for visual disturbance. Respiratory: Negative for shortness of breath. Cardiovascular: Negative for chest pain. Gastrointestinal: Negative for abdominal pain, constipation, diarrhea, nausea and vomiting. Genitourinary: Negative for difficulty urinating and dysuria. Musculoskeletal: Positive for joint swelling. Negative for arthralgias and myalgias. Neurological: Negative for numbness. Psychiatric/Behavioral: Negative for agitation and confusion. PHYSICAL EXAM   (up to 7 for level 4, 8 or more for level 5)      INITIAL VITALS:   /89   Pulse 123   Temp 99 °F (37.2 °C) (Oral)   Resp 18   SpO2 90%     Physical Exam  Vitals and nursing note reviewed. Constitutional:       General: He is not in acute distress. Appearance: Normal appearance. He is well-developed. He is obese. He is not ill-appearing or diaphoretic. HENT:      Head: Normocephalic and atraumatic. Right Ear: External ear normal.      Left Ear: External ear normal.      Nose: Nose normal.      Mouth/Throat:      Mouth: Mucous membranes are moist.   Eyes:      Extraocular Movements: Extraocular movements intact. Conjunctiva/sclera: Conjunctivae normal.   Neck:      Trachea: No tracheal deviation. Cardiovascular:      Rate and Rhythm: Regular rhythm. Tachycardia present. Heart sounds: Normal heart sounds. No murmur heard. No friction rub. No gallop. Comments: Heart rate assessed just after the patient had sat down  Pulmonary:      Effort: Pulmonary effort is normal. No respiratory distress. Breath sounds: Normal breath sounds. No wheezing, rhonchi or rales. Abdominal:      General: Abdomen is flat. There is no distension.    Musculoskeletal:         General: No swelling, deformity or signs of injury. Normal range of motion. Cervical back: Normal range of motion and neck supple. Comments: There is swelling and tenderness to palpation of the right lateral ankle. Trace pitting edema present bilaterally with intact DP pulses and 5 out of 5 muscle strength in the feet. No pain in the R foot over the calcaneus or base of the fifth metatarsal.   Skin:     General: Skin is warm and dry. Capillary Refill: Capillary refill takes less than 2 seconds. Coloration: Skin is not jaundiced. Findings: No bruising or lesion. Neurological:      General: No focal deficit present. Mental Status: He is alert and oriented to person, place, and time. Mental status is at baseline. Motor: No abnormal muscle tone. DIFFERENTIAL  DIAGNOSIS     PLAN (LABS / IMAGING / EKG):  Orders Placed This Encounter   Procedures    XR ANKLE RIGHT (MIN 3 VIEWS)    Ice to affected area    [employer] Ankle Brace, Right       MEDICATIONS ORDERED:  Orders Placed This Encounter   Medications    ibuprofen (ADVIL;MOTRIN) tablet 800 mg       DDX: Elsa Alvarado is a 35 y.o. male who presents to the emergency department with right-sided ankle pain. Differential diagnosis includes ankle sprain, ankle fracture, gouty arthritis    DIAGNOSTIC RESULTS / EMERGENCY DEPARTMENT COURSE / MDM   LAB RESULTS:  No results found for this visit on 07/24/21. IMPRESSION: Elsa Alvarado is a 35 y.o. male who presents to the emergency department with right-sided ankle pain in setting of prior ankle pain. On examination he is afebrile, vital signs demonstrate tachycardia after patient had ambulated and examination demonstrates a well-appearing male of stated age with bilateral trace pitting edema and right-sided lateral ankle pain and swelling with point tenderness to palpation.   Pulse and motor function are intact bilaterally and there is no significant pain in the toes to suggest gout. We will reassess heart rate after the patient has rested, obtain x-ray of the area to assess for new fracture, ice and treat pain. Patient states he feels that he can bear weight on the ankle and he does have crutches at home in the event that he needs to use them. RADIOLOGY:  XR ANKLE RIGHT (MIN 3 VIEWS)    Result Date: 6/28/2021  EXAMINATION: THREE XRAY VIEWS OF THE RIGHT ANKLE; THREE XRAY VIEWS OF THE RIGHT FOOT 6/28/2021 1:19 pm COMPARISON: Right foot plain radiographs from 04/29/2019 HISTORY: ORDERING SYSTEM PROVIDED HISTORY: inversion sprain 3d ago TECHNOLOGIST PROVIDED HISTORY: inversion sprain 1 ago 64-year-old male with inversion sprain 3 days ago FINDINGS: Right ankle: Moderate soft tissue swelling and edema about the ankle. Small tibiotalar joint effusion. Mild degenerative changes of the midfoot and tibiotalar joint. Mild plantar calcaneal spur. Boehler's angle is maintained. No acute fracture or dislocation. Ankle mortise appears intact. Right foot: Mild diffuse edema throughout the right foot. Moderate soft tissue swelling and edema about the ankle. Mild plantar calcaneal spur. Small tibiotalar joint effusion. Mild degenerative changes of the midfoot and tibiotalar joint. Osseous alignment is normal.  Boehler's angle is maintained. No acute fracture or dislocation. No marginal erosions. Mild degenerative changes of the 1st IP joint. Mild degenerative changes of the 1st MTP/MTS joints. Right ankle: 1. Soft tissue swelling and edema about the ankle. Small tibiotalar joint effusion. Mild degenerative changes as above. 2. No acute fracture or dislocation. Right foot: 1. Mild edema throughout the right foot. Moderate soft tissue swelling and edema about the ankle. 2. Small tibiotalar joint effusion. 3. Mild plantar calcaneal spur. Degenerative changes as detailed above. 4. No acute fracture or dislocation.      XR FOOT RIGHT (MIN 3 VIEWS)    Result Date: 6/28/2021  EXAMINATION: THREE XRAY VIEWS OF THE RIGHT ANKLE; THREE XRAY VIEWS OF THE RIGHT FOOT 6/28/2021 1:19 pm COMPARISON: Right foot plain radiographs from 04/29/2019 HISTORY: ORDERING SYSTEM PROVIDED HISTORY: inversion sprain 3d ago TECHNOLOGIST PROVIDED HISTORY: inversion sprain 1 ago 47-year-old male with inversion sprain 3 days ago FINDINGS: Right ankle: Moderate soft tissue swelling and edema about the ankle. Small tibiotalar joint effusion. Mild degenerative changes of the midfoot and tibiotalar joint. Mild plantar calcaneal spur. Boehler's angle is maintained. No acute fracture or dislocation. Ankle mortise appears intact. Right foot: Mild diffuse edema throughout the right foot. Moderate soft tissue swelling and edema about the ankle. Mild plantar calcaneal spur. Small tibiotalar joint effusion. Mild degenerative changes of the midfoot and tibiotalar joint. Osseous alignment is normal.  Boehler's angle is maintained. No acute fracture or dislocation. No marginal erosions. Mild degenerative changes of the 1st IP joint. Mild degenerative changes of the 1st MTP/MTS joints. Right ankle: 1. Soft tissue swelling and edema about the ankle. Small tibiotalar joint effusion. Mild degenerative changes as above. 2. No acute fracture or dislocation. Right foot: 1. Mild edema throughout the right foot. Moderate soft tissue swelling and edema about the ankle. 2. Small tibiotalar joint effusion. 3. Mild plantar calcaneal spur. Degenerative changes as detailed above. 4. No acute fracture or dislocation. EKG  None    All EKG's are interpreted by the Emergency Department Physician who either signs or co-signs this chart in the absence of a cardiologist.    EMERGENCY DEPARTMENT COURSE:  ED Course as of Jul 24 1142   Sat Jul 24, 2021   1141 X-ray read as suspicious for ligamentous injury. Patient briefed on the x-ray findings.   Repeat heart rate is less than 110 bpm.  Emphatically denied chest pain, shortness of breath, unilateral leg swelling other than R lateral ankle. Encouraged to seek primary care follow-up for his high heart rate. Patient verbalized understanding agreement plan. [TS]      ED Course User Index  [TS] Celestino Downing MD       PROCEDURES:  None    CONSULTS:  None    CRITICAL CARE:  Please see attending note. FINAL IMPRESSION      1. Acute right ankle pain          DISPOSITION / PLAN     DISPOSITION        PATIENT REFERRED TO:  [facility]  [address] [facility] [address] [phone]  Schedule an appointment as soon as possible for a visit in 1 week  For followup, for PCP establishment    [facility]  [address] [place] [address]  [phone]  Go to   As needed, If symptoms worsen    [address] [facility]  [phone]  Schedule an appointment as soon as possible for a visit in 1 week  For followup      DISCHARGE MEDICATIONS:  New Prescriptions    No medications on file       Celestino Downing MD  Emergency Medicine Resident    This patient was evaluated in the Emergency Department for symptoms described in the history of present illness. He/she was evaluated in the context of the global COVID-19 pandemic, which necessitated consideration that the patient might be at risk for infection with the SARS-CoV-2 virus that causes COVID-19. Institutional protocols and algorithms that pertain to the evaluation of patients at risk for COVID-19 are in a state of rapid change based on information released by regulatory bodies including the CDC and federal and state organizations. These policies and algorithms were followed during the patient's care in the ED.     (Please note that portions of thisnote were completed with a voice recognition program.  Efforts were made to edit the dictations but occasionally words are mis-transcribed.)       Celestino Downing MD  Resident  07/24/21 7977

## 2021-07-27 ENCOUNTER — HOSPITAL ENCOUNTER (OUTPATIENT)
Age: 41
Discharge: HOME | End: 2021-07-27
Payer: COMMERCIAL

## 2021-07-27 LAB
ABSOLUTE EOS #: 0.09 K/UL (ref 0–0.44)
ABSOLUTE IMMATURE GRANULOCYTE: 0.03 K/UL (ref 0–0.3)
ABSOLUTE LYMPH #: 2.57 K/UL (ref 1.1–3.7)
ABSOLUTE MONO #: 0.55 K/UL (ref 0.1–1.2)
ALBUMIN SERPL-MCNC: 4.1 G/DL (ref 3.5–5.2)
ALBUMIN/GLOBULIN RATIO: 1.1 (ref 1–2.5)
ALP BLD-CCNC: 102 U/L (ref 40–129)
ALT SERPL-CCNC: 16 U/L (ref 5–41)
ANION GAP SERPL CALCULATED.3IONS-SCNC: 14 MMOL/L (ref 9–17)
AST SERPL-CCNC: 16 U/L
BASOPHILS # BLD: 1 % (ref 0–2)
BASOPHILS ABSOLUTE: 0.04 K/UL (ref 0–0.2)
BILIRUB SERPL-MCNC: 0.29 MG/DL (ref 0.3–1.2)
BUN BLDV-MCNC: 15 MG/DL (ref 6–20)
BUN/CREAT BLD: ABNORMAL (ref 9–20)
CALCIUM SERPL-MCNC: 10.1 MG/DL (ref 8.6–10.4)
CHLORIDE BLD-SCNC: 103 MMOL/L (ref 98–107)
CHOLESTEROL/HDL RATIO: 3.4
CHOLESTEROL: 198 MG/DL
CO2: 26 MMOL/L (ref 20–31)
CREAT SERPL-MCNC: 0.99 MG/DL (ref 0.7–1.2)
CREATININE URINE: 341.8 MG/DL (ref 39–259)
DIFFERENTIAL TYPE: NORMAL
EOSINOPHILS RELATIVE PERCENT: 1 % (ref 1–4)
ESTIMATED AVERAGE GLUCOSE: 137 MG/DL
GFR AFRICAN AMERICAN: >60 ML/MIN
GFR NON-AFRICAN AMERICAN: >60 ML/MIN
GFR SERPL CREATININE-BSD FRML MDRD: ABNORMAL ML/MIN/{1.73_M2}
GFR SERPL CREATININE-BSD FRML MDRD: ABNORMAL ML/MIN/{1.73_M2}
GLUCOSE BLD-MCNC: 123 MG/DL (ref 70–99)
HBA1C MFR BLD: 6.4 % (ref 4–6)
HCT VFR BLD CALC: 44.8 % (ref 40.7–50.3)
HDLC SERPL-MCNC: 58 MG/DL
HEMOGLOBIN: 14.2 G/DL (ref 13–17)
IMMATURE GRANULOCYTES: 0 %
LDL CHOLESTEROL: 121 MG/DL (ref 0–130)
LYMPHOCYTES # BLD: 32 % (ref 24–43)
MCH RBC QN AUTO: 31.6 PG (ref 25.2–33.5)
MCHC RBC AUTO-ENTMCNC: 31.7 G/DL (ref 28.4–34.8)
MCV RBC AUTO: 99.8 FL (ref 82.6–102.9)
MICROALBUMIN/CREAT 24H UR: 99 MG/L
MICROALBUMIN/CREAT UR-RTO: 29 MCG/MG CREAT
MONOCYTES # BLD: 7 % (ref 3–12)
NRBC AUTOMATED: 0 PER 100 WBC
PDW BLD-RTO: 13.4 % (ref 11.8–14.4)
PLATELET # BLD: 317 K/UL (ref 138–453)
PLATELET ESTIMATE: NORMAL
PMV BLD AUTO: 8.5 FL (ref 8.1–13.5)
POTASSIUM SERPL-SCNC: 4.5 MMOL/L (ref 3.7–5.3)
RBC # BLD: 4.49 M/UL (ref 4.21–5.77)
RBC # BLD: NORMAL 10*6/UL
SEG NEUTROPHILS: 59 % (ref 36–65)
SEGMENTED NEUTROPHILS ABSOLUTE COUNT: 4.64 K/UL (ref 1.5–8.1)
SODIUM BLD-SCNC: 143 MMOL/L (ref 135–144)
TOTAL PROTEIN: 8 G/DL (ref 6.4–8.3)
TRIGL SERPL-MCNC: 95 MG/DL
VLDLC SERPL CALC-MCNC: NORMAL MG/DL (ref 1–30)
WBC # BLD: 7.9 K/UL (ref 3.5–11.3)
WBC # BLD: NORMAL 10*3/UL

## 2021-07-27 PROCEDURE — 80061 LIPID PANEL: CPT

## 2021-07-27 PROCEDURE — 80053 COMPREHEN METABOLIC PANEL: CPT

## 2021-07-27 PROCEDURE — 83036 HEMOGLOBIN GLYCOSYLATED A1C: CPT

## 2021-07-27 PROCEDURE — 85025 COMPLETE CBC W/AUTO DIFF WBC: CPT

## 2021-07-27 PROCEDURE — 36415 COLL VENOUS BLD VENIPUNCTURE: CPT

## 2021-07-27 PROCEDURE — 82570 ASSAY OF URINE CREATININE: CPT

## 2021-07-27 PROCEDURE — 82043 UR ALBUMIN QUANTITATIVE: CPT

## 2021-08-06 ENCOUNTER — TELEPHONE (OUTPATIENT)
Dept: ORTHOPEDIC SURGERY | Age: 41
End: 2021-08-06

## 2021-08-06 NOTE — TELEPHONE ENCOUNTER
A Referral was sent to the Orthopedic Specialist Department from the Emergency Department. I was unable to contact the patient to schedule an appointment. Each time I called, the telephone would hang up without anyone answering the call.

## 2021-08-26 ENCOUNTER — HOSPITAL ENCOUNTER (EMERGENCY)
Age: 41
Discharge: HOME | End: 2021-08-26
Attending: EMERGENCY MEDICINE
Payer: COMMERCIAL

## 2021-08-26 ENCOUNTER — APPOINTMENT (OUTPATIENT)
Dept: GENERAL RADIOLOGY | Age: 41
End: 2021-08-26
Payer: COMMERCIAL

## 2021-08-26 VITALS
TEMPERATURE: 98.1 F | SYSTOLIC BLOOD PRESSURE: 146 MMHG | RESPIRATION RATE: 16 BRPM | DIASTOLIC BLOOD PRESSURE: 96 MMHG | HEART RATE: 104 BPM | OXYGEN SATURATION: 98 %

## 2021-08-26 DIAGNOSIS — M79.672 LEFT FOOT PAIN: Primary | ICD-10-CM

## 2021-08-26 PROCEDURE — 99284 EMERGENCY DEPT VISIT MOD MDM: CPT

## 2021-08-26 PROCEDURE — 6370000000 HC RX 637 (ALT 250 FOR IP): Performed by: STUDENT IN AN ORGANIZED HEALTH CARE EDUCATION/TRAINING PROGRAM

## 2021-08-26 PROCEDURE — 73630 X-RAY EXAM OF FOOT: CPT

## 2021-08-26 RX ORDER — IBUPROFEN 800 MG/1
800 TABLET ORAL 2 TIMES DAILY PRN
Qty: 30 TABLET | Refills: 0 | Status: SHIPPED | OUTPATIENT
Start: 2021-08-26 | End: 2021-10-01 | Stop reason: SDUPTHER

## 2021-08-26 RX ORDER — IBUPROFEN 800 MG/1
800 TABLET ORAL ONCE
Status: COMPLETED | OUTPATIENT
Start: 2021-08-26 | End: 2021-08-26

## 2021-08-26 RX ORDER — COLCHICINE 0.6 MG/1
TABLET ORAL
Qty: 30 TABLET | Refills: 0 | Status: ON HOLD | OUTPATIENT
Start: 2021-08-26 | End: 2021-10-15 | Stop reason: SDUPTHER

## 2021-08-26 RX ADMIN — IBUPROFEN 800 MG: 800 TABLET, FILM COATED ORAL at 14:05

## 2021-08-26 ASSESSMENT — ENCOUNTER SYMPTOMS
RHINORRHEA: 0
SHORTNESS OF BREATH: 0
ABDOMINAL PAIN: 0
PHOTOPHOBIA: 0

## 2021-08-26 ASSESSMENT — PAIN DESCRIPTION - LOCATION: LOCATION: FOOT

## 2021-08-26 ASSESSMENT — PAIN DESCRIPTION - ORIENTATION: ORIENTATION: LEFT

## 2021-08-26 ASSESSMENT — PAIN DESCRIPTION - PAIN TYPE: TYPE: ACUTE PAIN

## 2021-08-26 ASSESSMENT — PAIN SCALES - GENERAL
PAINLEVEL_OUTOF10: 5
PAINLEVEL_OUTOF10: 5

## 2021-08-26 NOTE — ED TRIAGE NOTES
Pt ambulated to room 36 with co left foot pain. Pt has hx of gout and states this feels like a flair up. 3+ non pitting edema noted on left foot. PMS intact. Pt alert and oriented x 4, respirations even and unlabored, NAD noted at this time. Will continue to monitor.

## 2021-08-26 NOTE — ED PROVIDER NOTES
[facility] [address] [facility]     Emergency Department     Faculty Attestation        I performed a history and physical examination of the patient and discussed management with the resident. I reviewed the residents note and agree with the documented findings and plan of care. Any areas of disagreement are noted on the chart. I was personally present for the key portions of any procedures. I have documented in the chart those procedures where I was not present during the key portions. I have reviewed the emergency nurses triage note. I agree with the chief complaint, past medical history, past surgical history, allergies, medications, social and family history as documented unless otherwise noted below. For mid-level providers such as nurse practitioners as well as physicians assistants:    I have personally seen and evaluated the patient. I find the patient's history and physical exam are consistent with NP/PA documentation. I agree with the care provided, treatment rendered, disposition, & follow-up plan. Additional findings are as noted. Vital Signs: BP (!) 146/96   Pulse 104   Temp 98.1 °F (36.7 °C) (Oral)   Resp 16   SpO2 98%   PCP:  ERICA Cannon    Pertinent Comments:     Patient presents with left foot pain history of gout symptoms are similar to his last gout flare. No fevers or chills. His left foot is diffusely swollen but there is no warmth or signs suggest infectious etiology.       Critical Care  None          Toni Doan MD    Attending Emergency Medicine Physician              Gretchen Zuluaga MD  08/26/21 141

## 2021-08-26 NOTE — ED PROVIDER NOTES
[facility]  ED  Emergency Department Encounter  EmergencyMedicine Resident     Pt Name:Drew Hansen  MRN: [id number]  [place] 1980  Date of evaluation: 8/26/21  PCP:  ERICA Dacosta    This patient was evaluated in the Emergency Department for symptoms described in the history of present illness. The patient was evaluated in the context of the global COVID-19 pandemic, which necessitated consideration that the patient might be at risk for infection with the SARS-CoV-2 virus that causes COVID-19. Institutional protocols and algorithms that pertain to the evaluation of patients at risk for COVID-19 are in a state of rapid change based on information released by regulatory bodies including the CDC and federal and state organizations. These policies and algorithms were followed during the patient's care in the ED. CHIEF COMPLAINT       Chief Complaint   Patient presents with    Foot Pain     Lt foot pain, swelling. Pt has hx of gout       HISTORY OF PRESENT ILLNESS  (Location/Symptom, Timing/Onset, Context/Setting, Quality, Duration, Modifying Factors, Severity.)      Aly Mao is a 35 y.o. male who presents with complaint of left foot pain diffuse in nature. Patient denies any trauma he is on his feet for 10+ hours a day as part of his job. Patient is concerned that he is having a flareup of his gout he does have pain at the proximal MTP of the first toe however there is some swelling and tenderness throughout his foot he is able to ambulating on the foot with some discomfort. PAST MEDICAL / SURGICAL / SOCIAL / FAMILY HISTORY      has a past medical history of DVT, lower extremity ([address] Hypertension, Obesity, PE (pulmonary embolism), Post-thrombotic syndrome, Psychiatric problem, and Unspecified diseases of blood and blood-forming organs. has a past surgical history that includes Vena Cava Filter Placement.       Social History     Socioeconomic History    Marital status: Single     Spouse name: Not on file    Number of children: Not on file    Years of education: Not on file    Highest education level: Not on file   Occupational History    Not on file   Tobacco Use    Smoking status: Former Smoker     Packs/day: 0.25     Years: 10.00     Pack years: 2.50     Types: Cigarettes    Smokeless tobacco: Never Used   Substance and Sexual Activity    Alcohol use: Yes     Comment: occas    Drug use: No    Sexual activity: Not on file   Other Topics Concern    Not on file   Social History Narrative    Not on file     Social Determinants of Health     Financial Resource Strain:     Difficulty of Paying Living Expenses:    Food Insecurity:     Worried About Running Out of Food in the Last Year:     [address] in the Last Year:    Transportation Needs:     Lack of Transportation (Medical):  Lack of Transportation (Non-Medical):    Physical Activity:     Days of Exercise per Week:     Minutes of Exercise per Session:    Stress:     Feeling of Stress :    Social Connections:     Frequency of Communication with Friends and Family:     Frequency of Social Gatherings with Friends and Family:     Attends Denominational Services:     Active Member of Clubs or Organizations:     Attends Club or Organization Meetings:     Marital Status:    Intimate Partner Violence:     Fear of Current or Ex-Partner:     Emotionally Abused:     Physically Abused:     Sexually Abused:        Family History   Problem Relation Age of Onset    Diabetes Mother     Colon Cancer Mother        Allergies:  Patient has no known allergies. Home Medications:  Prior to Admission medications    Medication Sig Start Date End Date Taking?  Authorizing Provider   ibuprofen (IBU) 800 MG tablet Take 1 tablet by mouth every 6 hours as needed for Pain 6/28/21   Rose Sena DO   acetaminophen (TYLENOL) 325 MG tablet Take 2 tablets by mouth every 8 hours as needed for Pain 4/29/19   Barbra Gonzales MD   colchicine (COLCRYS) 0.6 MG tablet Take 1 tablet by mouth daily 4/29/19   Tabatha Rhoades MD   naproxen (NAPROSYN) 250 MG tablet Take 2 tablets by mouth 2 times daily (with meals) 12/14/18   Safia Castellanos DO       REVIEW OF SYSTEMS    (2-9 systems for level 4, 10 or more for level 5)      Review of Systems   Constitutional: Negative for fever. HENT: Negative for congestion and rhinorrhea. Eyes: Negative for photophobia. Respiratory: Negative for shortness of breath. Cardiovascular: Negative for chest pain. Gastrointestinal: Negative for abdominal pain. Musculoskeletal: Positive for arthralgias and gait problem. Skin: Negative for rash. Allergic/Immunologic: Negative for environmental allergies and food allergies. Neurological: Negative for numbness. Hematological: Negative for adenopathy. Psychiatric/Behavioral: Negative for agitation. PHYSICAL EXAM   (up to 7 for level 4, 8 or more for level 5)      INITIAL VITALS:   BP (!) 146/96   Pulse 104   Temp 98.1 °F (36.7 °C) (Oral)   Resp 16   SpO2 98%     Physical Exam  Vitals and nursing note reviewed. Constitutional:       Appearance: He is obese. Comments: Uncomfortably nontoxic   HENT:      Head: Normocephalic and atraumatic. Right Ear: External ear normal.      Left Ear: External ear normal.      Nose: Nose normal.      Mouth/Throat:      Pharynx: Oropharynx is clear. Eyes:      Conjunctiva/sclera: Conjunctivae normal.   Cardiovascular:      Rate and Rhythm: Tachycardia present. Pulses: Normal pulses. Pulmonary:      Effort: Pulmonary effort is normal.   Abdominal:      Palpations: Abdomen is soft. Tenderness: There is no abdominal tenderness. Musculoskeletal:         General: Swelling and tenderness present. Normal range of motion. Cervical back: Normal range of motion.       Comments: Tenderness and swelling nonpitting to dorsum of left foot no erythema pms intact   Skin:     General: Skin is arthritic change is present in the midfoot and 1st metatarsal-phalangeal joint. No acute fracture, dislocation, or worrisome cortical destruction is noted. Soft tissue swelling and arthritic changes. Erosions in the great toe suggestive of gouty arthritis. No acute fracture or acute bony destruction. EKG  none    All EKG's are interpreted by the Emergency Department Physician who either signs or Co-signs this chart in the absence of a cardiologist.    EMERGENCY DEPARTMENT COURSE:  Patient seen and evaluated x-ray imaging demonstrated evidence of gouty arthritis provided ibuprofen and started on colchicine discharged home with outpatient follow-up      PROCEDURES:  none    CONSULTS:  None    CRITICAL CARE:  none    FINAL IMPRESSION      1.  Left foot pain          DISPOSITION / PLAN     DISPOSITION  dc home with outpatient followup      PATIENT REFERRED TO:  ERICA [address]  [phone]    Call today  for followup and reevaluation in 1-2 days    [facility] ED  [facility]  [phone]  Go to   If symptoms worsen, As needed      DISCHARGE MEDICATIONS:  Discharge Medication List as of 8/26/2021  2:24 PM          Henry Rothman DO  Emergency Medicine Resident    (Please note that portions of thisnote were completed with a voice recognition program.  Efforts were made to edit the dictations but occasionally words are mis-transcribed.)        Henry Rothman DO  Resident  08/26/21 [id number]

## 2021-10-01 ENCOUNTER — HOSPITAL ENCOUNTER (EMERGENCY)
Age: 41
Discharge: HOME | End: 2021-10-01
Attending: EMERGENCY MEDICINE
Payer: COMMERCIAL

## 2021-10-01 ENCOUNTER — APPOINTMENT (OUTPATIENT)
Dept: CT IMAGING | Age: 41
End: 2021-10-01
Payer: COMMERCIAL

## 2021-10-01 VITALS
BODY MASS INDEX: 40.43 KG/M2 | OXYGEN SATURATION: 97 % | HEART RATE: 100 BPM | HEIGHT: 74 IN | WEIGHT: 315 LBS | RESPIRATION RATE: 18 BRPM | DIASTOLIC BLOOD PRESSURE: 84 MMHG | SYSTOLIC BLOOD PRESSURE: 130 MMHG | TEMPERATURE: 97.7 F
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
## 2021-10-01 LAB
ALBUMIN SERPL-MCNC: 3.9 G/DL (ref 3.5–5.2)
ALBUMIN/GLOBULIN RATIO: 1.1 (ref 1–2.5)
ALLEN TEST: ABNORMAL
ALP BLD-CCNC: 102 U/L (ref 40–129)
ALT SERPL-CCNC: 27 U/L (ref 5–41)
ANION GAP SERPL CALCULATED.3IONS-SCNC: 14 MMOL/L (ref 9–17)
AST SERPL-CCNC: 28 U/L
BILIRUB SERPL-MCNC: 0.23 MG/DL (ref 0.3–1.2)
BILIRUBIN DIRECT: 0.11 MG/DL
BILIRUBIN, INDIRECT: 0.12 MG/DL (ref 0–1)
BLOOD BANK SPECIMEN: ABNORMAL
BUN BLDV-MCNC: 13 MG/DL (ref 6–20)
CARBOXYHEMOGLOBIN: 2.1 % (ref 0–5)
CHLORIDE BLD-SCNC: 101 MMOL/L (ref 98–107)
CO2: 24 MMOL/L (ref 20–31)
CREAT SERPL-MCNC: 0.8 MG/DL (ref 0.7–1.2)
ETHANOL PERCENT: 0.21 %
ETHANOL: 209 MG/DL
FIO2: ABNORMAL
GFR AFRICAN AMERICAN: >60 ML/MIN
GFR NON-AFRICAN AMERICAN: >60 ML/MIN
GFR SERPL CREATININE-BSD FRML MDRD: ABNORMAL ML/MIN/{1.73_M2}
GFR SERPL CREATININE-BSD FRML MDRD: ABNORMAL ML/MIN/{1.73_M2}
GLOBULIN: ABNORMAL G/DL (ref 1.5–3.8)
GLUCOSE BLD-MCNC: 109 MG/DL (ref 70–99)
HCG QUALITATIVE: ABNORMAL
HCO3 VENOUS: 26.1 MMOL/L (ref 24–30)
HCT VFR BLD CALC: 44.2 % (ref 40.7–50.3)
HEMOGLOBIN: 14.1 G/DL (ref 13–17)
INR BLD: 0.9
LIPASE: 38 U/L (ref 13–60)
MCH RBC QN AUTO: 32.6 PG (ref 25.2–33.5)
MCHC RBC AUTO-ENTMCNC: 31.9 G/DL (ref 28.4–34.8)
MCV RBC AUTO: 102.3 FL (ref 82.6–102.9)
METHEMOGLOBIN: ABNORMAL % (ref 0–1.5)
MODE: ABNORMAL
NEGATIVE BASE EXCESS, VEN: 0.4 MMOL/L (ref 0–2)
NOTIFICATION TIME: ABNORMAL
NOTIFICATION: ABNORMAL
NRBC AUTOMATED: 0 PER 100 WBC
O2 DEVICE/FLOW/%: ABNORMAL
O2 SAT, VEN: 68.5 % (ref 60–85)
OXYHEMOGLOBIN: ABNORMAL % (ref 95–98)
PARTIAL THROMBOPLASTIN TIME: 21.9 SEC (ref 20.5–30.5)
PATIENT TEMP: 37
PCO2, VEN, TEMP ADJ: ABNORMAL MMHG (ref 39–55)
PCO2, VEN: 52.5 (ref 39–55)
PDW BLD-RTO: 14.3 % (ref 11.8–14.4)
PEEP/CPAP: ABNORMAL
PH VENOUS: 7.32 (ref 7.32–7.42)
PH, VEN, TEMP ADJ: ABNORMAL (ref 7.32–7.42)
PLATELET # BLD: 236 K/UL (ref 138–453)
PMV BLD AUTO: 9 FL (ref 8.1–13.5)
PO2, VEN, TEMP ADJ: ABNORMAL MMHG (ref 30–50)
PO2, VEN: 41.6 (ref 30–50)
POSITIVE BASE EXCESS, VEN: ABNORMAL MMOL/L (ref 0–2)
POTASSIUM SERPL-SCNC: 3.9 MMOL/L (ref 3.7–5.3)
PROTHROMBIN TIME: 10.1 SEC (ref 9.1–12.3)
PSV: ABNORMAL
PT. POSITION: ABNORMAL
RBC # BLD: 4.32 M/UL (ref 4.21–5.77)
RESPIRATORY RATE: ABNORMAL
SAMPLE SITE: ABNORMAL
SET RATE: ABNORMAL
SODIUM BLD-SCNC: 139 MMOL/L (ref 135–144)
TEXT FOR RESPIRATORY: ABNORMAL
TOTAL HB: ABNORMAL G/DL (ref 12–16)
TOTAL PROTEIN: 7.3 G/DL (ref 6.4–8.3)
TOTAL RATE: ABNORMAL
VT: ABNORMAL
WBC # BLD: 8.7 K/UL (ref 3.5–11.3)

## 2021-10-01 PROCEDURE — 6370000000 HC RX 637 (ALT 250 FOR IP): Performed by: STUDENT IN AN ORGANIZED HEALTH CARE EDUCATION/TRAINING PROGRAM

## 2021-10-01 PROCEDURE — 80076 HEPATIC FUNCTION PANEL: CPT

## 2021-10-01 PROCEDURE — 82805 BLOOD GASES W/O2 SATURATION: CPT

## 2021-10-01 PROCEDURE — 84703 CHORIONIC GONADOTROPIN ASSAY: CPT

## 2021-10-01 PROCEDURE — 85027 COMPLETE CBC AUTOMATED: CPT

## 2021-10-01 PROCEDURE — 6360000004 HC RX CONTRAST MEDICATION: Performed by: STUDENT IN AN ORGANIZED HEALTH CARE EDUCATION/TRAINING PROGRAM

## 2021-10-01 PROCEDURE — 83690 ASSAY OF LIPASE: CPT

## 2021-10-01 PROCEDURE — 3209999900 CT LUMBAR SPINE TRAUMA RECONSTRUCTION

## 2021-10-01 PROCEDURE — G0480 DRUG TEST DEF 1-7 CLASSES: HCPCS

## 2021-10-01 PROCEDURE — 85610 PROTHROMBIN TIME: CPT

## 2021-10-01 PROCEDURE — 82947 ASSAY GLUCOSE BLOOD QUANT: CPT

## 2021-10-01 PROCEDURE — 72125 CT NECK SPINE W/O DYE: CPT

## 2021-10-01 PROCEDURE — 85730 THROMBOPLASTIN TIME PARTIAL: CPT

## 2021-10-01 PROCEDURE — 80051 ELECTROLYTE PANEL: CPT

## 2021-10-01 PROCEDURE — 74177 CT ABD & PELVIS W/CONTRAST: CPT

## 2021-10-01 PROCEDURE — 99285 EMERGENCY DEPT VISIT HI MDM: CPT

## 2021-10-01 PROCEDURE — 84520 ASSAY OF UREA NITROGEN: CPT

## 2021-10-01 PROCEDURE — 82565 ASSAY OF CREATININE: CPT

## 2021-10-01 RX ORDER — IBUPROFEN 800 MG/1
800 TABLET ORAL ONCE
Status: DISCONTINUED | OUTPATIENT
Start: 2021-10-01 | End: 2021-10-01

## 2021-10-01 RX ORDER — IBUPROFEN 800 MG/1
800 TABLET ORAL 2 TIMES DAILY PRN
Qty: 20 TABLET | Refills: 0 | Status: SHIPPED | OUTPATIENT
Start: 2021-10-01 | End: 2022-07-08 | Stop reason: SDUPTHER

## 2021-10-01 RX ORDER — METHOCARBAMOL 500 MG/1
1000 TABLET, FILM COATED ORAL ONCE
Status: COMPLETED | OUTPATIENT
Start: 2021-10-01 | End: 2021-10-01

## 2021-10-01 RX ORDER — METHOCARBAMOL 500 MG/1
750 TABLET, FILM COATED ORAL 4 TIMES DAILY
Qty: 20 TABLET | Refills: 0 | Status: SHIPPED | OUTPATIENT
Start: 2021-10-01 | End: 2021-10-06

## 2021-10-01 RX ORDER — ACETAMINOPHEN 500 MG
1000 TABLET ORAL ONCE
Status: COMPLETED | OUTPATIENT
Start: 2021-10-01 | End: 2021-10-01

## 2021-10-01 RX ORDER — ACETAMINOPHEN 500 MG
1000 TABLET ORAL 3 TIMES DAILY
Qty: 20 TABLET | Refills: 0 | Status: SHIPPED | OUTPATIENT
Start: 2021-10-01

## 2021-10-01 RX ADMIN — METHOCARBAMOL 1000 MG: 500 TABLET ORAL at 01:24

## 2021-10-01 RX ADMIN — IOPAMIDOL 75 ML: 755 INJECTION, SOLUTION INTRAVENOUS at 02:56

## 2021-10-01 RX ADMIN — ACETAMINOPHEN 1000 MG: 500 TABLET ORAL at 01:24

## 2021-10-01 ASSESSMENT — ENCOUNTER SYMPTOMS
ABDOMINAL PAIN: 0
BACK PAIN: 1
SHORTNESS OF BREATH: 0
VOMITING: 0
NAUSEA: 0

## 2021-10-01 ASSESSMENT — PAIN SCALES - GENERAL: PAINLEVEL_OUTOF10: 4

## 2021-10-01 NOTE — ED NOTES
Bed: 20  Expected date:   Expected time:   Means of arrival:   Comments:  m3     Shraddha Haro RN  10/01/21 0006
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
## 2021-10-01 NOTE — ED NOTES
The following labs labeled with pt sticker and tubed to lab:     [x] Blue     [x] Lavender   [] on ice  [x] Green/yellow  [x] Green/black (x2) [x] on ice  [] Yellow  [] Red  [] Pink      [] [id number] swab    [] Rapid  [] PCR  [] Peds Viral Panel     [] Urine Sample  [] Pelvic Cultures  [] Blood Cultures            Timoteo Aldrich RN  10/01/21 5796

## 2021-10-04 ENCOUNTER — HOSPITAL ENCOUNTER (EMERGENCY)
Age: 41
Discharge: HOME | End: 2021-10-04
Attending: EMERGENCY MEDICINE
Payer: COMMERCIAL

## 2021-10-04 VITALS
TEMPERATURE: 98.4 F | RESPIRATION RATE: 16 BRPM | OXYGEN SATURATION: 100 % | DIASTOLIC BLOOD PRESSURE: 100 MMHG | HEART RATE: 97 BPM | SYSTOLIC BLOOD PRESSURE: 148 MMHG

## 2021-10-04 DIAGNOSIS — R11.0 NAUSEA: Primary | ICD-10-CM

## 2021-10-04 LAB
CHP ED QC CHECK: YES
GLUCOSE BLD-MCNC: 94 MG/DL
GLUCOSE BLD-MCNC: 94 MG/DL (ref 75–110)

## 2021-10-04 PROCEDURE — 82947 ASSAY GLUCOSE BLOOD QUANT: CPT

## 2021-10-04 PROCEDURE — 6370000000 HC RX 637 (ALT 250 FOR IP): Performed by: STUDENT IN AN ORGANIZED HEALTH CARE EDUCATION/TRAINING PROGRAM

## 2021-10-04 PROCEDURE — 99282 EMERGENCY DEPT VISIT SF MDM: CPT

## 2021-10-04 RX ORDER — ONDANSETRON 4 MG/1
4 TABLET, ORALLY DISINTEGRATING ORAL ONCE
Status: COMPLETED | OUTPATIENT
Start: 2021-10-04 | End: 2021-10-04

## 2021-10-04 RX ADMIN — ONDANSETRON 4 MG: 4 TABLET, ORALLY DISINTEGRATING ORAL at 11:43
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
## 2021-10-05 ASSESSMENT — ENCOUNTER SYMPTOMS
SHORTNESS OF BREATH: 0
VOMITING: 0
DIARRHEA: 0
RHINORRHEA: 0
SORE THROAT: 0
ABDOMINAL PAIN: 0
COUGH: 0
NAUSEA: 1

## 2021-10-14 ENCOUNTER — HOSPITAL ENCOUNTER (OUTPATIENT)
Age: 41
Setting detail: OBSERVATION
Discharge: HOME | End: 2021-10-15
Attending: EMERGENCY MEDICINE | Admitting: EMERGENCY MEDICINE
Payer: COMMERCIAL

## 2021-10-14 DIAGNOSIS — I82.452 ACUTE DEEP VEIN THROMBOSIS (DVT) OF LEFT PERONEAL VEIN (HCC): Primary | ICD-10-CM

## 2021-10-14 PROBLEM — I82.4Z2 DVT, LOWER EXTREMITY, DISTAL, ACUTE, LEFT (HCC): Status: ACTIVE | Noted: 2021-10-14

## 2021-10-14 LAB
ANION GAP SERPL CALCULATED.3IONS-SCNC: 10 MMOL/L (ref 9–17)
BUN BLDV-MCNC: 8 MG/DL (ref 6–20)
BUN/CREAT BLD: ABNORMAL (ref 9–20)
CALCIUM SERPL-MCNC: 9.4 MG/DL (ref 8.6–10.4)
CHLORIDE BLD-SCNC: 100 MMOL/L (ref 98–107)
CO2: 27 MMOL/L (ref 20–31)
CREAT SERPL-MCNC: 0.85 MG/DL (ref 0.7–1.2)
GFR AFRICAN AMERICAN: >60 ML/MIN
GFR NON-AFRICAN AMERICAN: >60 ML/MIN
GFR SERPL CREATININE-BSD FRML MDRD: ABNORMAL ML/MIN/{1.73_M2}
GFR SERPL CREATININE-BSD FRML MDRD: ABNORMAL ML/MIN/{1.73_M2}
GLUCOSE BLD-MCNC: 167 MG/DL (ref 70–99)
POTASSIUM SERPL-SCNC: 4.1 MMOL/L (ref 3.7–5.3)
SARS-COV-2, RAPID: NOT DETECTED
SODIUM BLD-SCNC: 137 MMOL/L (ref 135–144)
SPECIMEN DESCRIPTION: NORMAL

## 2021-10-14 PROCEDURE — G0378 HOSPITAL OBSERVATION PER HR: HCPCS

## 2021-10-14 PROCEDURE — 6370000000 HC RX 637 (ALT 250 FOR IP): Performed by: STUDENT IN AN ORGANIZED HEALTH CARE EDUCATION/TRAINING PROGRAM

## 2021-10-14 PROCEDURE — 99253 IP/OBS CNSLTJ NEW/EST LOW 45: CPT | Performed by: INTERNAL MEDICINE

## 2021-10-14 PROCEDURE — 80048 BASIC METABOLIC PNL TOTAL CA: CPT

## 2021-10-14 PROCEDURE — 93970 EXTREMITY STUDY: CPT

## 2021-10-14 PROCEDURE — 87635 SARS-COV-2 COVID-19 AMP PRB: CPT

## 2021-10-14 PROCEDURE — 99285 EMERGENCY DEPT VISIT HI MDM: CPT

## 2021-10-14 RX ORDER — IBUPROFEN 800 MG/1
800 TABLET ORAL EVERY 8 HOURS SCHEDULED
Status: DISCONTINUED | OUTPATIENT
Start: 2021-10-14 | End: 2021-10-15 | Stop reason: HOSPADM

## 2021-10-14 RX ORDER — ONDANSETRON 4 MG/1
4 TABLET, ORALLY DISINTEGRATING ORAL EVERY 8 HOURS PRN
Status: DISCONTINUED | OUTPATIENT
Start: 2021-10-14 | End: 2021-10-15 | Stop reason: HOSPADM

## 2021-10-14 RX ORDER — ACETAMINOPHEN 325 MG/1
650 TABLET ORAL EVERY 6 HOURS PRN
Status: DISCONTINUED | OUTPATIENT
Start: 2021-10-14 | End: 2021-10-15 | Stop reason: HOSPADM

## 2021-10-14 RX ORDER — COLCHICINE 0.6 MG/1
0.6 TABLET ORAL DAILY
Status: DISCONTINUED | OUTPATIENT
Start: 2021-10-14 | End: 2021-10-15 | Stop reason: HOSPADM

## 2021-10-14 RX ORDER — POLYETHYLENE GLYCOL 3350 17 G/17G
17 POWDER, FOR SOLUTION ORAL DAILY PRN
Status: DISCONTINUED | OUTPATIENT
Start: 2021-10-14 | End: 2021-10-15 | Stop reason: HOSPADM

## 2021-10-14 RX ORDER — ONDANSETRON 2 MG/ML
4 INJECTION INTRAMUSCULAR; INTRAVENOUS EVERY 6 HOURS PRN
Status: DISCONTINUED | OUTPATIENT
Start: 2021-10-14 | End: 2021-10-15 | Stop reason: HOSPADM

## 2021-10-14 RX ADMIN — IBUPROFEN 800 MG: 800 TABLET, FILM COATED ORAL at 22:23

## 2021-10-14 RX ADMIN — COLCHICINE 0.6 MG: 0.6 TABLET, FILM COATED ORAL at 18:04

## 2021-10-14 RX ADMIN — IBUPROFEN 800 MG: 800 TABLET, FILM COATED ORAL at 16:06

## 2021-10-14 RX ADMIN — RIVAROXABAN 15 MG: 15 TABLET, FILM COATED ORAL at 20:11

## 2021-10-14 ASSESSMENT — PAIN DESCRIPTION - LOCATION
LOCATION: HAND
LOCATION: HAND;FOOT
LOCATION: HAND;FOOT

## 2021-10-14 ASSESSMENT — ENCOUNTER SYMPTOMS
SHORTNESS OF BREATH: 0
COLOR CHANGE: 0
ABDOMINAL PAIN: 0
COUGH: 0
VOMITING: 0
NAUSEA: 0

## 2021-10-14 ASSESSMENT — PAIN SCALES - GENERAL
PAINLEVEL_OUTOF10: 4
PAINLEVEL_OUTOF10: 8
PAINLEVEL_OUTOF10: 5
PAINLEVEL_OUTOF10: 8
PAINLEVEL_OUTOF10: 4

## 2021-10-14 ASSESSMENT — PAIN DESCRIPTION - ORIENTATION: ORIENTATION: RIGHT

## 2021-10-14 NOTE — ED NOTES
Pt placed in a gown for vascular exam.Patient is resting on cart, RR even and unlabored.   Side rails up x2, call light within reach, will continue with plan of care       Rylee Howard LPN  90/07/26 2621

## 2021-10-14 NOTE — LETTER
Adriana Steff  Med Surg  [address] [address]  Phone: [phone]    No name on file. October 15, 2021     Patient: Miriam Jorgensen   YOB: 1980   Date of Visit: 10/14/2021       To Whom It May Concern:    Sherley Subramanian was at [facility] from 10/14/2021 - 10/15/2021    If you have any questions or concerns, please don't hesitate to call.     Sincerely,        Yolie Philip RN

## 2021-10-14 NOTE — ED NOTES
Pt placed on continuous monitor with alarms set.  The following labs were labeled with patient stickers & tubed to lab;    []Lavender   []On Ice  []Blue  []Green/ Yellow  []Green/ Black []On Ice  []Pink  []Red  []Yellow    [x]COVID-19 Swab [x]Rapid    []Urine Sample  []Pelvic Cultures    []Blood Cultures       Javier Cook LPN  47/26/39 6668

## 2021-10-14 NOTE — ED NOTES
Pt presents to the ED c/o left foot and right hand swelling and pain. Pt denies any injury or trauma. Pt has a significant medical hx of gout. Pt states he has been out of his gout medication for 2 weeks. Pt A&O x 4, does not appear in acute distress, RR even and unlabored, resting comfortably on stretcher with eyes open and call light in reach.  Initial assessment performed by physician, Priti Del Valle will carry out initial orders/tasks and reassess pt.       Elian Zuñiga LPN  26/50/83 8061

## 2021-10-14 NOTE — ED PROVIDER NOTES
[address] [facility]     Emergency Department     Faculty Attestation    I performed a history and physical examination of the patient and discussed management with the resident. I reviewed the residents note and agree with the documented findings and plan of care. Any areas of disagreement are noted on the chart. I was personally present for the key portions of any procedures. I have documented in the chart those procedures where I was not present during the key portions. I have reviewed the emergency nurses triage note. I agree with the chief complaint, past medical history, past surgical history, allergies, medications, social and family history as documented unless otherwise noted below. For Physician Assistant/ Nurse Practitioner cases/documentation I have personally evaluated this patient and have completed at least one if not all key elements of the E/M (history, physical exam, and MDM). Additional findings are as noted. I have personally seen and evaluated the patient. I find the patient's history and physical exam are consistent with the NP/PA documentation. I agree with the care provided, treatment rendered, disposition and follow-up plan. 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 Right hand feels similar to gout episodes he has had in the past, has been out of his medications for couple of weeks. Left ankle does not feel like gout, more swollen, similar to DVTs he has had in the past.  He is not on anticoagulation.   He does not believe he has ever had a work-up for hematologic abnormalities/hypercoagulability    Exam:  General: Sitting on the bed, awake, alert and in no acute distress  CV: normal rate and regular rhythm  Lungs: Breathing comfortably on room air with no tachypnea, hypoxia, or increased work of breathing  MSK: Tenderness over the MCP joint of the first and second digit of the right hand, significant swelling. No warmth or redness. 1+ edema of the left leg to the midshin, no edema in the right leg. Plan:  Doppler to rule out recurrent DVT  Treat gout for hand  Lower concern of septic arthritis given lack of fever, history of gout in the same joint, similar to gout in the past, off medications.   If patient has DVT, will consider speaking with hematology for hypercoagulable work-up        Mell Guardado MD   Attending Emergency  Physician    (Please note that portions of this note were completed with a voice recognition program. Efforts were made to edit the dictations but occasionally words are mis-transcribed.)              Mell Guardado MD  10/14/21 [id number]

## 2021-10-14 NOTE — ED NOTES
The following labs were labeled with patient stickers & tubed to lab;    [x]Lavender   []On Ice  []Blue  [x]Green/ Yellow  []Green/ Black []On Ice  []Pink  []Red  []Yellow    []COVID-19 Swab []Rapid    []Urine Sample  []Pelvic Cultures    []Blood Cultures       Rylee Howard LPN  25/73/06 [id number]

## 2021-10-14 NOTE — ED NOTES
Pt A&O x 4, does not appear in acute distress, RR even and unlabored, resting comfortably on stretcher with eyes open and call light in reach. Writer will continue to monitor pt closely.        Bobby Aden LPN  09/11/63 6325

## 2021-10-14 NOTE — ED PROVIDER NOTES
[facility] ED  Emergency Department Encounter  Emergency Medicine Resident     Pt Name: Agustin Lares  MRN: [id number]  [place] 1980  Date of evaluation: 10/14/21  PCP:  ERICA Baum    CHIEF COMPLAINT       Chief Complaint   Patient presents with    Edema     right hand, left foot    Foot Pain     left    Hand Pain     right       HISTORY OFPRESENT ILLNESS  (Location/Symptom, Timing/Onset, Context/Setting, Quality, Duration, Modifying [place].)      Agustin Lares is a 35 y.o. male with past medical history DVT, hypertension, gout, pulmonary embolism with IVC filter in place who presents with complaints of right hand pain and left ankle swelling. Patient states that right hand pain has been ongoing for the last few days, patient does have a history of gout and feels hand pain is consistent with prior gout attacks. He has been without his colchicine for the last 2 weeks. He states he has been taking Motrin with some relief in his symptoms. Patient states for the last 4 days he has noticed left ankle swelling which is worse after being on his feet all day. Patient denies right lower extremity swelling. Patient states no injury or trauma to the area. He denies pain or gait abnormalities. No chest pain or shortness of breath. PAST MEDICAL / SURGICAL / SOCIAL / FAMILY HISTORY      has a past medical history of DVT, lower extremity ([address] Hypertension, Obesity, PE (pulmonary embolism), Post-thrombotic syndrome, Psychiatric problem, and Unspecified diseases of blood and blood-forming organs. has a past surgical history that includes [place] C[place] Filter Placement.     Social History     Socioeconomic History    Marital status: Single     Spouse name: Not on file    Number of children: Not on file    Years of education: Not on file    Highest education level: Not on file   Occupational History    Not on file   Tobacco Use    Smoking status: Former Smoker Packs/day: 0.25     Years: 10.00     Pack years: 2.50     Types: Cigarettes    Smokeless tobacco: Never Used   Substance and Sexual Activity    Alcohol use: Yes     Comment: occas    Drug use: No    Sexual activity: Not on file   Other Topics Concern    Not on file   Social History Narrative    Not on file     Social Determinants of Health     Financial Resource Strain:     Difficulty of Paying Living Expenses:    Food Insecurity:     Worried About Running Out of Food in the Last Year:     Ran Out of Food in the Last Year:    Transportation Needs:     Lack of Transportation (Medical):  Lack of Transportation (Non-Medical):    Physical Activity:     Days of Exercise per Week:     Minutes of Exercise per Session:    Stress:     Feeling of Stress :    Social Connections:     Frequency of Communication with Friends and Family:     Frequency of Social Gatherings with Friends and Family:     Attends L[employer] Services:     Active Member of Clubs or Organizations:     Attends Club or Organization Meetings:     Marital Status:    Intimate Partner Violence:     Fear of Current or Ex-Partner:     Emotionally Abused:     Physically Abused:     Sexually Abused:        Family History   Problem Relation Age of Onset    Diabetes Mother     Colon Cancer Mother        Allergies:  Patient has no known allergies. Home Medications:  Prior to Admission medications    Medication Sig Start Date End Date Taking?  Authorizing Provider   ibuprofen (ADVIL;MOTRIN) 800 MG tablet Take 1 tablet by mouth 2 times daily as needed for Pain 10/1/21   Elfida Brittle,    acetaminophen (TYLENOL) 500 MG tablet Take 2 tablets by mouth 3 times daily 10/1/21   Elfida Brittle, DO   colchicine (COLCRYS) 0.6 MG tablet One tablet per hour or every two hours until relief of gouty pain and inflammation, up to a total of 4 mg or until upset stomach occurs 8/26/21   Kumar Hart, DO   naproxen (NAPROSYN) 250 MG tablet Take 2 tablets by mouth 2 times daily (with meals) 12/14/18 10/1/21  Janeth Crandall,        REVIEW OF SYSTEMS    (2-9 systems for level 4, 10 or more for level 5)      Review of Systems   Constitutional: Negative for chills and fever. HENT: Negative for congestion. Respiratory: Negative for cough and shortness of breath. Cardiovascular: Negative for chest pain. Gastrointestinal: Negative for abdominal pain, nausea and vomiting. Musculoskeletal:        Positive for right hand pain and swelling, positive for left ankle swelling. Negative for gait abnormality. Skin: Negative for color change and rash. Neurological: Negative for weakness and numbness. PHYSICAL EXAM   (up to 7 for level 4, 8 or more for level 5)     INITIAL VITALS:    height is 6' 2\" (1.88 m). His oral temperature is 98.1 °F (36.7 °C). His blood pressure is 135/80 and his pulse is 77. His respiration is 21 and oxygen saturation is 96%. Physical Exam  Constitutional:       General: He is not in acute distress. Appearance: Normal appearance. He is not ill-appearing or toxic-appearing. Cardiovascular:      Rate and Rhythm: Normal rate and regular rhythm. Pulses: Normal pulses. Heart sounds: No murmur heard. No gallop. Pulmonary:      Effort: Pulmonary effort is normal. No respiratory distress. Breath sounds: Normal breath sounds. No wheezing. Abdominal:      General: Abdomen is flat. Palpations: Abdomen is soft. Tenderness: There is no abdominal tenderness. Musculoskeletal:      Comments: Right hand is notably swollen, and appears warm to the touch. No erythema. Patient does have pain to palpation base of right first digit. Left lower extremity appears markedly swollen compared with right lower extremity. Bilateral radial, DP, PT pulses are equal and intact. No pain to palpation in the left ankle. No erythema, warmth palpated in the left lower extremity.    Skin:     General: Skin is warm and dry.      Capillary Refill: Capillary refill takes less than 2 seconds. Neurological:      Mental Status: He is alert. DIFFERENTIAL  DIAGNOSIS     PLAN (LABS / IMAGING / EKG):  Orders Placed This Encounter   Procedures    COVID-19, Rapid    VL DUP LOWER EXTREMITY VENOUS BILATERAL    BASIC METABOLIC PANEL    ADULT DIET; Regular    Vital signs per unit routine    Telemetry monitoring - 72 hour duration    Notify physician    Up as tolerated    Full Code    Inpatient consult to Oncology    Initiate Oxygen Therapy Protocol    EKG 12 lead    PATIENT STATUS (FROM ED OR OR/PROCEDURAL) Observation       MEDICATIONS ORDERED:  Orders Placed This Encounter   Medications    polyethylene glycol (GLYCOLAX) packet 17 g    acetaminophen (TYLENOL) tablet 650 mg    OR Linked Order Group     ondansetron (ZOFRAN-ODT) disintegrating tablet 4 mg     ondansetron (ZOFRAN) injection 4 mg    rivaroxaban (XARELTO) tablet 15 mg    colchicine (COLCRYS) tablet 0.6 mg    ibuprofen (ADVIL;MOTRIN) tablet 800 mg       DDX: Gout, osteoarthritis, DVT    Initial MDM/Plan: 35 y.o. male who presents with right hand pain and swelling as well as left ankle pain less swelling of several days duration. Patient does have history of DVT, PE with filter in place as well as a history of gout and has been without his gout medications for some time. Not currently on any anticoagulation medication. Patient seen and examined, no acute distress. Vital signs are normal.  Heart and lung exam benign. Patient does have right hand pain and swelling and warm to the touch consistent with probable gout flare. Left lower extremity is swollen when compared with right lower extremity however is intact pulses bilaterally. No pain to palpation left lower extremity. Concern for DVT given history of DVTs.   Will obtain Doppler ultrasound to evaluate for DVT, treat gout symptoms with NSAIDs as well as colchicine given patient has taken colchicine in the past and is requesting this medication. If DVT study is positive possible need for admission given patient has never had a hypercoagulable work-up in the past.    DIAGNOSTIC RESULTS / [address] [place] [address] / [facility]     LABS:  Labs Reviewed   BASIC METABOLIC PANEL - Abnormal; Notable for the following components:       Result Value    Glucose 167 (*)     All other components within normal limits   COVID-19, RAPID         RADIOLOGY:  No results found. EMERGENCY DEPARTMENT COURSE:      Received call from vascular [employer] Doppler is positive for left-sided acute peroneal DVT. Given multiple episodes of DVT as well as PE in the past without any hypercoagulable work-up hematology/oncology was paged for consultation. They stated they would see the patient during admission, okay for patient to be restarted on Xarelto. Xarelto ordered, patient is admitted to observation unit for hematology/oncology evaluation. PROCEDURES:  None    CONSULTS:  IP CONSULT TO ONCOLOGY    CRITICAL CARE:  Please see attending note    FINAL IMPRESSION      1. Acute deep vein thrombosis (DVT) of left peroneal vein ([address]          DISPOSITION / PLAN     DISPOSITION Admitted 10/14/2021 03:56:18 PM        PATIENTREFERRED TO:  No follow-up provider specified.     DISCHARGE MEDICATIONS:  New Prescriptions    No medications on file       Luis Akers DO  EmergencyMedicine Resident    (Please note that portions of this note were completed with a voice recognition program.  Efforts were made to edit the dictations but occasionally words are mis-transcribed.)        Luis Akers DO  Resident  10/14/21 [id number]

## 2021-10-14 NOTE — ED NOTES
Pt returned to room from vascular on stretcher via tech. Pt A&O x 4, does not appear in acute distress, RR even and unlabored, resting comfortably on stretcher with eyes open and call light in reach. Writer will continue to monitor pt closely.               Mar Miramontes LPN  83/87/37 1367

## 2021-10-15 VITALS
RESPIRATION RATE: 20 BRPM | WEIGHT: 300 LBS | HEART RATE: 84 BPM | OXYGEN SATURATION: 98 % | TEMPERATURE: 97 F | DIASTOLIC BLOOD PRESSURE: 108 MMHG | SYSTOLIC BLOOD PRESSURE: 150 MMHG | BODY MASS INDEX: 38.5 KG/M2 | HEIGHT: 74 IN

## 2021-10-15 LAB
EKG ATRIAL RATE: 79 BPM
EKG P AXIS: 51 DEGREES
EKG P-R INTERVAL: 142 MS
EKG Q-T INTERVAL: 406 MS
EKG QRS DURATION: 80 MS
EKG QTC CALCULATION (BAZETT): 465 MS
EKG R AXIS: -12 DEGREES
EKG T AXIS: 37 DEGREES
EKG VENTRICULAR RATE: 79 BPM

## 2021-10-15 PROCEDURE — 93010 ELECTROCARDIOGRAM REPORT: CPT | Performed by: INTERNAL MEDICINE

## 2021-10-15 PROCEDURE — G0378 HOSPITAL OBSERVATION PER HR: HCPCS

## 2021-10-15 PROCEDURE — 93005 ELECTROCARDIOGRAM TRACING: CPT | Performed by: STUDENT IN AN ORGANIZED HEALTH CARE EDUCATION/TRAINING PROGRAM

## 2021-10-15 PROCEDURE — 6370000000 HC RX 637 (ALT 250 FOR IP): Performed by: STUDENT IN AN ORGANIZED HEALTH CARE EDUCATION/TRAINING PROGRAM

## 2021-10-15 RX ORDER — COLCHICINE 0.6 MG/1
TABLET ORAL
Qty: 30 TABLET | Refills: 1 | Status: SHIPPED | OUTPATIENT
Start: 2021-10-15 | End: 2022-07-08 | Stop reason: SDUPTHER

## 2021-10-15 RX ORDER — IBUPROFEN 200 MG
800 TABLET ORAL EVERY 6 HOURS PRN
Qty: 90 TABLET | Refills: 3 | Status: SHIPPED | OUTPATIENT
Start: 2021-10-15 | End: 2021-10-30

## 2021-10-15 RX ADMIN — IBUPROFEN 800 MG: 800 TABLET, FILM COATED ORAL at 07:53

## 2021-10-15 RX ADMIN — RIVAROXABAN 15 MG: 15 TABLET, FILM COATED ORAL at 07:57

## 2021-10-15 RX ADMIN — ACETAMINOPHEN 650 MG: 325 TABLET ORAL at 07:54

## 2021-10-15 RX ADMIN — COLCHICINE 0.6 MG: 0.6 TABLET, FILM COATED ORAL at 07:57

## 2021-10-15 ASSESSMENT — PAIN SCALES - GENERAL
PAINLEVEL_OUTOF10: 5
PAINLEVEL_OUTOF10: 5

## 2021-10-15 NOTE — PROGRESS NOTES
Discharge teaching and instructions completed with patient using teachback method. AVS reviewed. Prescriptions given to patient. Patient voiced understanding regarding prescriptions, follow up appointments, and care of self at home. Discharged home via cab. All questions answered.
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
## 2021-10-15 NOTE — PROGRESS NOTES
[address]  CDU / OBSERVATION ENCOUNTER  ATTENDING NOTE       I performed a history and physical examination of the patient and discussed management with the resident or midlevel provider. I reviewed the resident or midlevel provider's note and agree with the documented findings and plan of care. Any areas of disagreement are noted on the chart. I was personally present for the key portions of any procedures. I have documented in the chart those procedures where I was not present during the key portions. I have reviewed the nurses notes. I agree with the chief complaint, past medical history, past surgical history, allergies, medications, social and family history as documented unless otherwise noted below. The Family history, social history, and ROS are effectively unchanged since admission unless noted elsewhere in the chart. Patient presents with swelling of right hand and swelling of left ankle. Patient has a history of gout. Patient is been out of his medications for approximately 2 weeks. Patient has had swelling over the last several days also of his ankle and pain while at work today. Patient has a history of DVT and PE with a history of [place] filter placement. Patient denies any chest pain shortness of breath cough or fever. Patient was seen by heme-onc. Patient has [place] filter in place. Patient had acute DVT of the posterior tibial vein. This is a second episode of DVT. DVT seems unprovoked. Heme-onc recommending outpatient follow-up and hypercoagulable work-up after acute episode resolves. Patient kept overnight for monitoring stability. Patient for anticoagulation and outpatient follow-up. Patient well now. Patient has medications available. Patient was given scripts for ongoing care. Patient knows when to follow-up.     Jadon Keating MD  Attending Emergency  Physician

## 2021-10-15 NOTE — PROGRESS NOTES
OBS/CDU   RESIDENT NOTE      Patients PCP is:  ERICA Fountain        SUBJECTIVE      Patient is doing well, tolerating p.o., no complaints. He has without left leg pain at this time. He states he is not on any daily blood thinners at home and states the medication needs started thus far has been tolerating well. He is relaying to writer with a plan for follow-up with outpatient hematology in 2 months. Writer reassures him that this is the plan and to call and make an appointment for that time. PHYSICAL EXAM      General: NAD, AO X 3  Heent: EMOI, PERRL, strabismus of left eye  Neck: SUPPLE, NO JVD  Cardiovascular: RRR, S1S2  Pulmonary: CTAB, NO SOB  Abdomen: SOFT, NTTP, ND, +BS  Extremities: +2/4 PULSES DISTAL, no significant swelling  Neuro / Psych: NO NUMBNESS OR TINGLING, MENTATION AT BASELINE    PERTINENT TEST /EXAMS      I have reviewed all available laboratory results. MEDICATIONS CURRENT   polyethylene glycol (GLYCOLAX) packet 17 g, Daily PRN  acetaminophen (TYLENOL) tablet 650 mg, Q6H PRN  ondansetron (ZOFRAN-ODT) disintegrating tablet 4 mg, Q8H PRN   Or  ondansetron (ZOFRAN) injection 4 mg, Q6H PRN  rivaroxaban (XARELTO) tablet 15 mg, BID WC  colchicine (COLCRYS) tablet 0.6 mg, Daily  ibuprofen (ADVIL;MOTRIN) tablet 800 mg, 3 times per day        All medication charted and reviewed. CONSULTS      IP CONSULT TO ONCOLOGY    ASSESSMENT/PLAN       Leeanne Mo is a 35 y.o. male who presents with pain in his hand related to his baseline gout and complained of left leg plain in the emergency department and subsequently found to have a acute DVT. patient has a past medical history of DVTs and PEs. [place] IVC filter placed at time of first diagnosis. Admitted for further management. hemeatology consulted. Acute DVT - left leg posterior tibial vein  · Hematology consulted okay with starting Xarelto and discharging patient on Xarelto.   Follow-up in outpatient clinic in 2 months for hypercoagulability work-up. · Continue home medications and pain control  · Monitor vitals, labs, and imaging  · DISPO: pending consults and clinical improvement    --  Oswald Yusuf MD  Emergency Medicine Resident Physician     This dictation was generated by voice recognition computer software. Although all attempts are made to edit the dictation for accuracy, there may be errors in the transcription that are not intended.

## 2021-10-15 NOTE — H&P
[facility]  CDU / [address] [facility] NOTE     Pt Name: Rosy Rapp  MRN: [id number]  [place] 1980  Date of evaluation: 10/14/21  Patient's PCP is :  ERICA Hall    CHIEF COMPLAINT       Chief Complaint   Patient presents with    Edema     right hand, left foot    Foot Pain     left    Hand Pain     right         HISTORY OF PRESENT ILLNESS    Rosy Rapp is a 35 y.o. male who presents with complaints of right hand pain and swelling and left ankle swelling. Patient states he has a history of gout and has been out of his medication for approximately 2 weeks. Patient states for the last several days he has noticed some swelling of his left ankle and had pain today while at work. Patient has a history of DVTs and a PE with a history of a [place] filter placed. Patient currently denies chest pain, shortness of breath, fever and chills. Location/Symptom: Right hand pain and swelling, left ankle swelling  Timing/Onset: Approximately 4 days  Provocation: Unknown  Quality: Ache   Radiation: Nonradiating  Severity: 6/10  Timing/Duration: Intermittent  Modifying Factors: n/a    REVIEW OF SYSTEMS       Review of Systems   Constitutional: Negative for appetite change, diaphoresis, fatigue and fever. Respiratory: Negative for shortness of breath. Cardiovascular: Positive for leg swelling. Negative for chest pain. Gastrointestinal: Negative for nausea. Musculoskeletal: Positive for arthralgias and myalgias. Neurological: Negative for dizziness and light-headedness. Psychiatric/Behavioral: Negative for agitation and behavioral problems. (PQRS) Advance directives on face sheet per hospital policy.  No change unless specifically mentioned in chart    PAST MEDICAL HISTORY    has a past medical history of DVT, lower extremity ([address] Hypertension, Obesity, PE (pulmonary embolism), Post-thrombotic syndrome, Psychiatric problem, and Unspecified diseases of blood and blood-forming organs. I have reviewed the past medical history with the patient and it is pertinent to this complaint. SURGICAL HISTORY      has a past surgical history that includes [place] Filter Placement. I have reviewed and agree with Surgical History entered and it is pertinent to this complaint. CURRENT MEDICATIONS     polyethylene glycol (GLYCOLAX) packet 17 g, Daily PRN  acetaminophen (TYLENOL) tablet 650 mg, Q6H PRN  ondansetron (ZOFRAN-ODT) disintegrating tablet 4 mg, Q8H PRN   Or  ondansetron (ZOFRAN) injection 4 mg, Q6H PRN  rivaroxaban (XARELTO) tablet 15 mg, BID WC  colchicine (COLCRYS) tablet 0.6 mg, Daily  ibuprofen (ADVIL;MOTRIN) tablet 800 mg, 3 times per day        All medication charted and reviewed. ALLERGIES     has No Known Allergies. FAMILY HISTORY     He indicated that the status of his mother is unknown.     family history includes Colon Cancer in his mother; Diabetes in his mother. The patient denies any pertinent family history. I have reviewed and agree with the family history entered. I have reviewed the Family History and it is not significant to the case    SOCIAL HISTORY      reports that he has quit smoking. His smoking use included cigarettes. He has a 2.50 pack-year smoking history. He has never used smokeless tobacco. He reports current alcohol use. He reports that he does not use drugs. I have reviewed and agree with all Social.  There are no concerns for substance abuse/use. PHYSICAL EXAM     INITIAL VITALS:  height is 6' 2\" (1.88 m) and weight is 300 lb (136.1 kg). His temperature is 97.7 °F (36.5 °C). His blood pressure is 175/99 (abnormal) and his pulse is 92. His respiration is 20 and oxygen saturation is 99%. Physical Exam  Vitals and nursing note reviewed. HENT:      Head: Normocephalic. Eyes:      Extraocular Movements: Extraocular movements intact. Pupils: Pupils are equal, round, and reactive to light. Cardiovascular:      Rate and Rhythm: Normal rate. Pulses: Normal pulses. Heart sounds: Normal heart sounds. Pulmonary:      Effort: Pulmonary effort is normal.   Abdominal:      General: Bowel sounds are normal.   Musculoskeletal:      Left lower leg: Edema present. Skin:     General: Skin is warm. Neurological:      General: No focal deficit present. Mental Status: He is alert and oriented to person, place, and time. Psychiatric:         Mood and Affect: Mood normal.             DIFFERENTIAL DIAGNOSIS/MDM:     DDx: Acute DVT, gout    DIAGNOSTIC RESULTS       RADIOLOGY:   I directly visualized the following  images and reviewed the radiologist interpretations:    VL DUP LOWER EXTREMITY VENOUS BILATERAL    Result Date: 10/14/2021    [facility]  Vascular Lower Extremities DVT Study Procedure   Patient Name   Abdoul Rodriguez Date of Study           10/14/2021                 BYRON   Date of Birth  1980  Gender                  Male   Age            35 year(s)  Race                    Black   Room Number    CHELLE   Corporate ID # [id number]   Patient Acct # [de-identified]   MR #           [id number]     Harley Cooney RVT   Accession #    [id number]  Interpreting Physician  Claudeen Ares   Referring                  Referring Physician     Dinah Kraus DO  Nurse  Practitioner  Procedure Type of Study:   Veins: Lower Extremities DVT Study, Venous Scan Lower Bilateral.  Indications for Study:Swelling. Patient Status:ER. Technical Quality:Limited visualization. Limitation reason:Swelling.   - Critical Result:Findings were reported to Dinah Kraus DO on     10/14/2021 at 2:24p by Cruz Cook RVT. Conclusions   Summary   Acute deep vein thrombosis of the left leg involving the posterior tibial  vein.    Signature   ----------------------------------------------------------------  Electronically signed by Betty Mclean RVT(Sonographer) on  10/14/2021 02:45 PM  ----------------------------------------------------------------   ----------------------------------------------------------------  Electronically signed by Jerilyn Erps Reyes,Arthur(Interpreting  physician) on 10/14/2021 06:15 PM  ----------------------------------------------------------------  Findings:   Right Impression:                     Left Impression:  The common femoral, femoral,          The posterior tibial veins are non  popliteal and tibial veins            compressible with echoes. demonstrate normal compressibility  and augmentation. The common femoral, femoral and                                        popliteal veins demonstrate normal  Normal compressibility of the great   compressibility and augmentation. saphenous vein. Normal compressibility of the great  Normal compressibility of the small   saphenous vein. saphenous vein. Normal compressibility of the small                                        saphenous vein. Risk Factors History +-----------+----------+---------------------------------------------------+ ! Diagnosis  ! Date      ! Comments                                           ! +-----------+----------+---------------------------------------------------+ ! Previous   !05/19/2011! webbed echoes in left popliteal vein               ! !Scan       !          !                                                   ! +-----------+----------+---------------------------------------------------+ ! H/O DVT    ! !LT leg, 2004; PE                                   ! +-----------+----------+---------------------------------------------------+ ! Previous   ! 10/08/2011! LR popliteal webbed echos; tibioperoneal trunk     ! !Scan       !          !slightly partially compressible                    ! +-----------+----------+---------------------------------------------------+ ! Previous   !02/14/2015! left wnl                                           ! !Scan       !          !                                                   ! +-----------+----------+---------------------------------------------------+ ! Previous   !09/20/2013! left pop partially compressible, one vein of TB    ! ! Scan       !          !trunk partially compressible                       ! +-----------+----------+---------------------------------------------------+ ! Previous   !05/16/2015! Negative for DVT and SVT in the lower extremities  ! !Scan       !          !bilaterally. ! ! ! !Varicose veins noted in the left lower extremity. ! +-----------+----------+---------------------------------------------------+ ! Previous   ! ! Negative for DVT and SVT in the left lower         ! !Scan       !          !extremity. ! +-----------+----------+---------------------------------------------------+ ! Previous   !08/21/2015! Negative for DVT and SVT in the left lower         ! !Scan       !          !extremity. ! +-----------+----------+---------------------------------------------------+ Velocities are measured in cm/s ; Diameters are measured in cm Right Lower Extremities DVT Study Measurements Right 2D Measurements +------------------------------------+----------+---------------+----------+ ! Location                            ! Visualized! Compressibility! Thrombosis! +------------------------------------+----------+---------------+----------+ ! Common Femoral                      !Yes       ! Yes            ! None      ! +------------------------------------+----------+---------------+----------+ ! Prox Femoral                        !Yes       ! Yes            ! None      ! +------------------------------------+----------+---------------+----------+ ! Mid Femoral                         !Yes       ! Yes            ! None ! +------------------------------------+----------+---------------+----------+ ! Dist Femoral                        !Partial   !Yes            ! None      ! +------------------------------------+----------+---------------+----------+ ! Deep Femoral                        !Yes       ! Yes            ! None      ! +------------------------------------+----------+---------------+----------+ ! Popliteal                           !Yes       ! Yes            ! None      ! +------------------------------------+----------+---------------+----------+ ! Sapheno Femoral Junction            ! Yes       ! Yes            ! None      ! +------------------------------------+----------+---------------+----------+ ! PTV                                 ! Yes       ! Yes            ! None      ! +------------------------------------+----------+---------------+----------+ ! Peroneal                            !Partial   !Yes            ! None      ! +------------------------------------+----------+---------------+----------+ ! Gastroc                             ! Yes       ! Yes            ! None      ! +------------------------------------+----------+---------------+----------+ ! GSV Thigh                           ! Yes       ! Yes            ! None      ! +------------------------------------+----------+---------------+----------+ ! GSV Knee                            ! Yes       ! Yes            ! None      ! +------------------------------------+----------+---------------+----------+ ! GSV Ankle                           ! Yes       ! Yes            ! None      ! +------------------------------------+----------+---------------+----------+ ! SSV                                 ! Yes       ! Yes            ! None      ! +------------------------------------+----------+---------------+----------+ Right Doppler Measurements +---------------------------+------+------+--------------------------------+ ! Location                   ! Signal!Reflux! Reflux (msec) ! +---------------------------+------+------+--------------------------------+ ! Common Femoral             !Phasic!      !                                ! +---------------------------+------+------+--------------------------------+ ! Prox Femoral               !Phasic!      !                                ! +---------------------------+------+------+--------------------------------+ ! Popliteal                  !Phasic!      !                                ! +---------------------------+------+------+--------------------------------+ Left Lower Extremities DVT Study Measurements Left 2D Measurements +------------------------------------+----------+---------------+----------+ ! Location                            ! Visualized! Compressibility! Thrombosis! +------------------------------------+----------+---------------+----------+ ! Common Femoral                      !Yes       ! Yes            ! None      ! +------------------------------------+----------+---------------+----------+ ! Prox Femoral                        !Yes       ! Yes            ! None      ! +------------------------------------+----------+---------------+----------+ ! Mid Femoral                         !Yes       ! Yes            ! None      ! +------------------------------------+----------+---------------+----------+ ! Dist Femoral                        !Partial   !Yes            ! None      ! +------------------------------------+----------+---------------+----------+ ! Deep Femoral                        !Yes       ! Yes            ! None      ! +------------------------------------+----------+---------------+----------+ ! Popliteal                           !Yes       ! Yes            ! None      ! +------------------------------------+----------+---------------+----------+ ! Sapheno Femoral Junction            ! Yes       ! Yes            ! None      ! +------------------------------------+----------+---------------+----------+ ! PTV !Yes       !No             !Acute     ! +------------------------------------+----------+---------------+----------+ ! Peroneal                            !No        !               !          ! +------------------------------------+----------+---------------+----------+ ! Gastroc                             ! Yes       ! Yes            ! None      ! +------------------------------------+----------+---------------+----------+ ! GSV Thigh                           ! Yes       ! Yes            ! None      ! +------------------------------------+----------+---------------+----------+ ! GSV Knee                            ! Yes       ! Yes            ! None      ! +------------------------------------+----------+---------------+----------+ ! GSV Ankle                           ! Yes       ! Yes            ! None      ! +------------------------------------+----------+---------------+----------+ ! SSV                                 ! Yes       ! Yes            ! None      ! +------------------------------------+----------+---------------+----------+ Left Doppler Measurements +---------------------------+------+------+--------------------------------+ ! Location                   ! Signal!Reflux! Reflux (msec)                   ! +---------------------------+------+------+--------------------------------+ ! Common Femoral             !Phasic!      !                                ! +---------------------------+------+------+--------------------------------+ ! Prox Femoral               !Phasic!      !                                ! +---------------------------+------+------+--------------------------------+ ! Popliteal                  !Phasic!      !                                ! +---------------------------+------+------+--------------------------------+      LABS:  I have reviewed and interpreted all available lab results.   Labs Reviewed   BASIC METABOLIC PANEL - Abnormal; Notable for the following components:       Result Value Glucose 167 (*)     All other components within normal limits   COVID-19, RAPID       SCREENING TOOLS:    HEART Risk Score for Chest Pain Patients   History and Physical Exam Suspicion Level  (Nausea, Vomiting, Diaphoresis, Radiation, Exertion)   Slightly Suspicious (0 pts)   Moderately Suspicious (1 pt)   Highly Suspicious (2 pts)   EKG Interpretation   Normal (0 pts)   Non-Specific Repolarization Disturbance (1 pt)   Significant ST-Depression (2 pts)   Age of Patient (in years)   = 39 (0 pts)   46-64 (1 pt)   = 65 (2 pts)   Risk Factors   No Risk Factors (0 pts)   1-2 Risk Factors (1 pt)   = 3 Risk Factors (2 pts)   Risk Factors Include:   Hypercholesterolemia   Hypertension   Diabetes Mellitus   Cigarette smoking   Positive family history   Obesity   CAD   (SLE, CKDz, HIV, Cocaine abuse)   Troponin Levels   = Normal Limit (0 pts)   1-3 Times Normal Limit (1 pt)   > 3 Times Normal Limit (2 pts)  TOTAL:    Percent Risk for Major Adverse Cardiac Event (MACE)  0-3 pts indicates low risk for MACE   2.5% (DISCHARGE)   4-7 pts indicates moderate risk for MACE  20.3% (OBS)  8-10 pts indicates high risk for MACE  72.7% (EARLY INVASIVE [place])    CDU HOLLY / Don Cabello is a 35 y.o. male who presents with  complaints of right hand pain and swelling and left ankle swelling. Patient states he has a history of gout and has been out of his medication for approximately 2 weeks. Patient states for the last several days he has noticed some swelling of his left ankle and had pain today while at work. Patient has a history of DVTs and a PE with a history of a [place] filter placed. Given clinical presentation will admit for further observation and hematology evaluation to determine further need for anticoagulation.     1. Inpatient consult to hematology/oncology  · Symptom management  · Continue home medications and pain control  · Monitor vitals, labs, and imaging  · DISPO: pending consults and clinical improvement    CONSULTS:    IP CONSULT TO ONCOLOGY    PROCEDURES:  Not indicated       PATIENT REFERRED TO:    No follow-up provider specified. --  My Supervising Physician for today is Dr. Sara Peterson  We discussed and agreed upon a treatment plan  Gee Holden, [address] [place] [address]   Emergency Medicine Resident     This dictation was generated by voice recognition computer software. Although all attempts are made to edit the dictation for accuracy, there may be errors in the transcription that are not intended.

## 2021-10-15 NOTE — DISCHARGE INSTR - DIET

## 2022-04-14 ENCOUNTER — APPOINTMENT (OUTPATIENT)
Dept: GENERAL RADIOLOGY | Age: 42
End: 2022-04-14
Payer: COMMERCIAL

## 2022-04-14 ENCOUNTER — HOSPITAL ENCOUNTER (EMERGENCY)
Age: 42
Discharge: HOME | End: 2022-04-14
Attending: EMERGENCY MEDICINE
Payer: COMMERCIAL

## 2022-04-14 VITALS
HEART RATE: 91 BPM | OXYGEN SATURATION: 95 % | TEMPERATURE: 98.1 F | DIASTOLIC BLOOD PRESSURE: 94 MMHG | SYSTOLIC BLOOD PRESSURE: 161 MMHG | RESPIRATION RATE: 18 BRPM

## 2022-04-14 DIAGNOSIS — R19.7 NAUSEA VOMITING AND DIARRHEA: Primary | ICD-10-CM

## 2022-04-14 DIAGNOSIS — R11.2 NAUSEA VOMITING AND DIARRHEA: Primary | ICD-10-CM

## 2022-04-14 LAB
-: ABNORMAL
ABSOLUTE EOS #: 0.04 K/UL (ref 0–0.44)
ABSOLUTE IMMATURE GRANULOCYTE: <0.03 K/UL (ref 0–0.3)
ABSOLUTE LYMPH #: 1.86 K/UL (ref 1.1–3.7)
ABSOLUTE MONO #: 0.58 K/UL (ref 0.1–1.2)
ANION GAP SERPL CALCULATED.3IONS-SCNC: 13 MMOL/L (ref 9–17)
BASOPHILS # BLD: 1 % (ref 0–2)
BASOPHILS ABSOLUTE: 0.04 K/UL (ref 0–0.2)
BILIRUBIN URINE: NEGATIVE
BUN BLDV-MCNC: 18 MG/DL (ref 6–20)
CALCIUM SERPL-MCNC: 9.5 MG/DL (ref 8.6–10.4)
CASTS UA: ABNORMAL /LPF (ref 0–8)
CHLORIDE BLD-SCNC: 99 MMOL/L (ref 98–107)
CO2: 26 MMOL/L (ref 20–31)
COLOR: YELLOW
CREAT SERPL-MCNC: 0.89 MG/DL (ref 0.7–1.2)
EOSINOPHILS RELATIVE PERCENT: 1 % (ref 1–4)
EPITHELIAL CELLS UA: ABNORMAL /HPF (ref 0–5)
FLU A ANTIGEN: NEGATIVE
FLU B ANTIGEN: NEGATIVE
GFR AFRICAN AMERICAN: >60 ML/MIN
GFR NON-AFRICAN AMERICAN: >60 ML/MIN
GFR SERPL CREATININE-BSD FRML MDRD: ABNORMAL ML/MIN/{1.73_M2}
GLUCOSE BLD-MCNC: 106 MG/DL (ref 70–99)
GLUCOSE URINE: NEGATIVE
HCT VFR BLD CALC: 42.5 % (ref 40.7–50.3)
HEMOGLOBIN: 14.5 G/DL (ref 13–17)
IMMATURE GRANULOCYTES: 0 %
KETONES, URINE: NEGATIVE
LEUKOCYTE ESTERASE, URINE: NEGATIVE
LYMPHOCYTES # BLD: 32 % (ref 24–43)
MCH RBC QN AUTO: 33.8 PG (ref 25.2–33.5)
MCHC RBC AUTO-ENTMCNC: 34.1 G/DL (ref 28.4–34.8)
MCV RBC AUTO: 99.1 FL (ref 82.6–102.9)
MONOCYTES # BLD: 10 % (ref 3–12)
NITRITE, URINE: NEGATIVE
NRBC AUTOMATED: 0 PER 100 WBC
PDW BLD-RTO: 13.6 % (ref 11.8–14.4)
PH UA: 5 (ref 5–8)
PLATELET # BLD: 257 K/UL (ref 138–453)
PMV BLD AUTO: 8.8 FL (ref 8.1–13.5)
POTASSIUM SERPL-SCNC: 4.6 MMOL/L (ref 3.7–5.3)
PROTEIN UA: ABNORMAL
RBC # BLD: 4.29 M/UL (ref 4.21–5.77)
RBC UA: ABNORMAL /HPF (ref 0–4)
SEG NEUTROPHILS: 56 % (ref 36–65)
SEGMENTED NEUTROPHILS ABSOLUTE COUNT: 3.33 K/UL (ref 1.5–8.1)
SODIUM BLD-SCNC: 138 MMOL/L (ref 135–144)
SPECIFIC GRAVITY UA: 1.02 (ref 1–1.03)
TURBIDITY: CLEAR
URINE HGB: ABNORMAL
UROBILINOGEN, URINE: NORMAL
WBC # BLD: 5.9 K/UL (ref 3.5–11.3)
WBC UA: ABNORMAL /HPF (ref 0–5)

## 2022-04-14 PROCEDURE — 6370000000 HC RX 637 (ALT 250 FOR IP): Performed by: STUDENT IN AN ORGANIZED HEALTH CARE EDUCATION/TRAINING PROGRAM

## 2022-04-14 PROCEDURE — 81001 URINALYSIS AUTO W/SCOPE: CPT

## 2022-04-14 PROCEDURE — 99282 EMERGENCY DEPT VISIT SF MDM: CPT

## 2022-04-14 PROCEDURE — 71045 X-RAY EXAM CHEST 1 VIEW: CPT

## 2022-04-14 PROCEDURE — 85025 COMPLETE CBC W/AUTO DIFF WBC: CPT

## 2022-04-14 PROCEDURE — 87804 INFLUENZA ASSAY W/OPTIC: CPT

## 2022-04-14 PROCEDURE — 94640 AIRWAY INHALATION TREATMENT: CPT

## 2022-04-14 PROCEDURE — 80048 BASIC METABOLIC PNL TOTAL CA: CPT

## 2022-04-14 RX ORDER — ONDANSETRON 4 MG/1
4 TABLET, ORALLY DISINTEGRATING ORAL ONCE
Status: COMPLETED | OUTPATIENT
Start: 2022-04-14 | End: 2022-04-14

## 2022-04-14 RX ORDER — ONDANSETRON 4 MG/1
4 TABLET, FILM COATED ORAL EVERY 8 HOURS PRN
Qty: 20 TABLET | Refills: 0 | Status: SHIPPED | OUTPATIENT
Start: 2022-04-14

## 2022-04-14 RX ORDER — ALBUTEROL SULFATE 90 UG/1
2 AEROSOL, METERED RESPIRATORY (INHALATION) EVERY 6 HOURS PRN
Status: DISCONTINUED | OUTPATIENT
Start: 2022-04-14 | End: 2022-04-15 | Stop reason: HOSPADM

## 2022-04-14 RX ADMIN — ALBUTEROL SULFATE 2 PUFF: 90 AEROSOL, METERED RESPIRATORY (INHALATION) at 21:45

## 2022-04-14 RX ADMIN — ONDANSETRON 4 MG: 4 TABLET, ORALLY DISINTEGRATING ORAL at 21:00

## 2022-04-14 NOTE — Clinical Note
Nabil Ribeiro was seen and treated in our emergency department on 4/14/2022. He may return to work on 04/15/2022. If you have any questions or concerns, please don't hesitate to call.       Kannan Marquez MD

## 2022-04-15 NOTE — PROGRESS NOTES
This patient was assigned to me by error. This patient was never interviewed nor examined by me. I did not participate in the care of this patient.     Diana Mendez MD  Emergency Medicine Resident

## 2022-04-15 NOTE — ED PROVIDER NOTES
[facility] ED  Emergency Department Encounter  EmergencyMedicine Resident     Pt Name:Drew Diaz Si  MRN: [id number]  [place] 1980  Date of evaluation: 4/16/22  PCP:  ERICA Molina    This patient was evaluated in the Emergency Department for symptoms described in the history of present illness. The patient was evaluated in the context of the global COVID-19 pandemic, which necessitated consideration that the patient might be at risk for infection with the SARS-CoV-2 virus that causes COVID-19. Institutional protocols and algorithms that pertain to the evaluation of patients at risk for COVID-19 are in a state of rapid change based on information released by regulatory bodies including the CDC and federal and state organizations. These policies and algorithms were followed during the patient's care in the ED. CHIEF COMPLAINT       Nausea vomiting and diarrhea    HISTORY OF PRESENT ILLNESS  (Location/Symptom, Timing/Onset, Context/Setting, Quality, Duration, Modifying Factors, Severity.)      Shady Amador is a 38 y.o. male who presents with nausea vomiting and diarrhea that started this morning. Patient states that he has had multiple episodes of vomiting and diarrhea. He is also had a cough. Patient has a history of asthma and is noncompliant with his medication. Patient also states that he had multiple episodes of lightheadedness yesterday. He states that he has been off his blood pressure medication. Denies any chest pain or shortness of breath. States that he was recently in the hospital due to high blood pressures and has not yet filled his medication. No episodes of syncope. Patient does state that a few days ago he had blood in his urine that has since resolved. He denies any dysuria or hematuria at this time. He states that his urine does have a strange smell to it.     PAST MEDICAL / SURGICAL / SOCIAL / FAMILY HISTORY      has a past medical history of DVT, lower extremity ([address] Hypertension, Obesity, PE (pulmonary embolism), Post-thrombotic syndrome, Psychiatric problem, and Unspecified diseases of blood and blood-forming organs. has a past surgical history that includes Vena Cava Filter Placement. Social History     Socioeconomic History    Marital status: Single     Spouse name: Not on file    Number of children: Not on file    Years of education: Not on file    Highest education level: Not on file   Occupational History    Not on file   Tobacco Use    Smoking status: Former Smoker     Packs/day: 0.25     Years: 10.00     Pack years: 2.50     Types: Cigarettes    Smokeless tobacco: Never Used   Substance and Sexual Activity    Alcohol use: Yes     Comment: occas    Drug use: No    Sexual activity: Not on file   Other Topics Concern    Not on file   Social History Narrative    Not on file     Social Determinants of Health     Financial Resource Strain:     Difficulty of Paying Living Expenses: Not on file   Food Insecurity:     Worried About Running Out of Food in the Last Year: Not on file    Anneliese of Food in the Last Year: Not on file   Transportation Needs:     Lack of Transportation (Medical): Not on file    Lack of Transportation (Non-Medical):  Not on file   Physical Activity:     Days of Exercise per Week: Not on file    Minutes of Exercise per Session: Not on file   Stress:     Feeling of Stress : Not on file   Social Connections:     Frequency of Communication with Friends and Family: Not on file    Frequency of Social Gatherings with Friends and Family: Not on file    Attends Gnosticist Services: Not on file    Active Member of Clubs or Organizations: Not on file    Attends Club or Organization Meetings: Not on file    Marital Status: Not on file   Intimate Partner Violence:     Fear of Current or Ex-Partner: Not on file    Emotionally Abused: Not on file    Physically Abused: Not on file    Sexually Abused: Not on file   Housing Stability:     Unable to Pay for Housing in the Last Year: Not on file    Number of Places Lived in the Last Year: Not on file    Unstable Housing in the Last Year: Not on file       Family History   Problem Relation Age of Onset    Diabetes Mother     Colon Cancer Mother        Allergies:  Patient has no known allergies. Home Medications:  Prior to Admission medications    Medication Sig Start Date End Date Taking? Authorizing Provider   ondansetron (ZOFRAN) 4 MG tablet Take 1 tablet by mouth every 8 hours as needed for Nausea 4/14/22  Yes Kannan Marquez MD   rivaroxaban (XARELTO) 15 MG TABS tablet Take 1 tablet by mouth 2 times daily (with meals) 10/15/21 1/13/22  Raymond Liriano MD   colchicine (COLCRYS) 0.6 MG tablet One tablet per hour or every two hours until relief of gouty pain and inflammation, up to a total of 4 mg or until upset stomach occurs 10/15/21   Raymond Liriano MD   ibuprofen (ADVIL) 200 MG tablet Take 4 tablets by mouth every 6 hours as needed for Pain 10/15/21 10/30/21  Raymond Liriano MD   ibuprofen (ADVIL;MOTRIN) 800 MG tablet Take 1 tablet by mouth 2 times daily as needed for Pain 10/1/21   Jasmyn Gess, DO   acetaminophen (TYLENOL) 500 MG tablet Take 2 tablets by mouth 3 times daily 10/1/21   Jasmyn Gess, DO   naproxen (NAPROSYN) 250 MG tablet Take 2 tablets by mouth 2 times daily (with meals) 12/14/18 10/1/21  Katie Parr, DO       REVIEW OF SYSTEMS    (2-9 systems for level 4, 10 or more for level 5)      Review of Systems   Constitutional: Negative for chills and fever. HENT: Negative for facial swelling and trouble swallowing. Eyes: Negative for visual disturbance. Respiratory: Positive for cough and wheezing. Negative for chest tightness and shortness of breath. Cardiovascular: Negative for chest pain. Gastrointestinal: Positive for diarrhea, nausea and vomiting. Negative for abdominal distention and abdominal pain.    Genitourinary: Negative for difficulty urinating. Musculoskeletal: Negative for back pain and neck stiffness. Neurological: Negative for headaches. Psychiatric/Behavioral: Negative for agitation and hallucinations. PHYSICAL EXAM   (up to 7 for level 4, 8 or more for level 5)      INITIAL VITALS:   BP (!) 161/94   Pulse 91   Temp 98.1 °F (36.7 °C) (Oral)   Resp 18   SpO2 95%     Physical Exam  Constitutional:       General: He is awake. Appearance: Normal appearance. HENT:      Head: Normocephalic and atraumatic. Right Ear: External ear normal.      Left Ear: External ear normal.      Nose: Nose normal.   Eyes:      General: Lids are normal.      Extraocular Movements: Extraocular movements intact. Cardiovascular:      Rate and Rhythm: Normal rate. Pulses: Normal pulses. Heart sounds: Normal heart sounds. Pulmonary:      Effort: Pulmonary effort is normal.      Breath sounds: Wheezing present. Musculoskeletal:      Cervical back: Normal range of motion. Comments: Normal range of motion, strength and sensation intact in all extremities. Neurological:      Mental Status: He is alert and oriented to person, place, and time. Sensory: Sensation is intact. Motor: Motor function is intact. Psychiatric:         Mood and Affect: Mood normal.         Behavior: Behavior is cooperative. DIFFERENTIAL  DIAGNOSIS     PLAN (LABS / IMAGING / EKG):  Orders Placed This Encounter   Procedures    RAPID INFLUENZA A/B ANTIGENS    XR CHEST PORTABLE    CBC with Auto Differential    Basic Metabolic Panel w/ Reflex to MG    Urinalysis with Microscopic    Vital signs       MEDICATIONS ORDERED:  Orders Placed This Encounter   Medications    DISCONTD: albuterol sulfate  (90 Base) MCG/ACT inhaler 2 puff     Order Specific Question:   Initiate RT Bronchodilator Protocol     Answer:    Yes    ondansetron (ZOFRAN-ODT) disintegrating tablet 4 mg    ondansetron (ZOFRAN) 4 MG tablet     Sig: Take 1 tablet by mouth every 8 hours as needed for Nausea     Dispense:  20 tablet     Refill:  0       DDX: Influenza, influenza B, viral URI, asthma exacerbation, medication noncompliance    MDM: 38 y.o. male presents today with nausea vomiting and diarrhea. Patient states that he has not been able to eat much today. He denies any pain. UA, urine culture, CBC, BMP and rapid flu test are ordered. Patient is given a dose of Zofran. Some mild wheezing is heard bilaterally and RT is consulted for an albuterol treatment. Patient has improvement after the Zofran. His labs come back unremarkable. Patient will be discharged home and told to follow-up with his PCP. He is advised to be compliant with his medications and take them as prescribed. He is given strict return precautions and discharged home. DIAGNOSTIC RESULTS / EMERGENCY DEPARTMENT COURSE / MDM   LAB RESULTS:  Results for orders placed or performed during the hospital encounter of 04/14/22   RAPID INFLUENZA A/B ANTIGENS    Specimen: Nasopharyngeal   Result Value Ref Range    Flu A Antigen NEGATIVE NEGATIVE    Flu B Antigen NEGATIVE NEGATIVE   CBC with Auto Differential   Result Value Ref Range    WBC 5.9 3.5 - 11.3 k/uL    RBC 4.29 4. 21 - 5.77 m/uL    Hemoglobin 14.5 13.0 - 17.0 g/dL    Hematocrit 42.5 40.7 - 50.3 %    MCV 99.1 82.6 - 102.9 fL    MCH 33.8 (H) 25.2 - 33.5 pg    MCHC 34.1 28.4 - 34.8 g/dL    RDW 13.6 11.8 - 14.4 %    Platelets 389 423 - 195 k/uL    MPV 8.8 8.1 - 13.5 fL    NRBC Automated 0.0 0.0 per 100 WBC    Seg Neutrophils 56 36 - 65 %    Lymphocytes 32 24 - 43 %    Monocytes 10 3 - 12 %    Eosinophils % 1 1 - 4 %    Basophils 1 0 - 2 %    Immature Granulocytes 0 0 %    Segs Absolute 3.33 1.50 - 8.10 k/uL    Absolute Lymph # 1.86 1.10 - 3.70 k/uL    Absolute Mono # 0.58 0.10 - 1.20 k/uL    Absolute Eos # 0.04 0.00 - 0.44 k/uL    Basophils Absolute 0.04 0.00 - 0.20 k/uL    Absolute Immature Granulocyte <0.03 0.00 - 0.30 k/uL   Basic Metabolic Panel w/ Reflex to MG   Result Value Ref Range    Glucose 106 (H) 70 - 99 mg/dL    BUN 18 6 - 20 mg/dL    CREATININE 0.89 0.70 - 1.20 mg/dL    Calcium 9.5 8.6 - 10.4 mg/dL    Sodium 138 135 - 144 mmol/L    Potassium 4.6 3.7 - 5.3 mmol/L    Chloride 99 98 - 107 mmol/L    CO2 26 20 - 31 mmol/L    Anion Gap 13 9 - 17 mmol/L    GFR Non-African American >60 >60 mL/min    GFR African American >60 >60 mL/min    GFR Comment         Urinalysis with Microscopic   Result Value Ref Range    Color, UA Yellow Yellow    Turbidity UA Clear Clear    Glucose, Ur NEGATIVE NEGATIVE    Bilirubin Urine NEGATIVE NEGATIVE    Ketones, Urine NEGATIVE NEGATIVE    Specific Gravity, UA 1.022 1.005 - 1.030    Urine Hgb TRACE (A) NEGATIVE    pH, UA 5.0 5.0 - 8.0    Protein, UA TRACE (A) NEGATIVE    Urobilinogen, Urine Normal Normal    Nitrite, Urine NEGATIVE NEGATIVE    Leukocyte Esterase, Urine NEGATIVE NEGATIVE    -          WBC, UA 2 TO 5 0 - 5 /HPF    RBC, UA None 0 - 4 /HPF    Casts UA  0 - 8 /LPF     0 TO 2 HYALINE Reference range defined for non-centrifuged specimen. Epithelial Cells UA 5 TO 10 0 - 5 /HPF           RADIOLOGY:  XR CHEST PORTABLE   Final Result   No evidence of acute cardiopulmonary disease. EMERGENCY DEPARTMENT COURSE:  ED Course as of 04/16/22 2227   Thu Apr 14, 2022 2105 Patient is noncompliant with his medications. States that he had multiple episodes of lightheadedness yesterday. Woke up this morning with nausea, vomiting, diarrhea. States that he has had a cough as well. Patient has a history of asthma but is not noncompliant with a recent albuterol inhaler [SS]   2106 Albuterol treatment ordered due to wheezing [SS]   2110 Works in [address] [place] [address]   [SS]      ED Course User Index  [SS] Talia Perea MD        PROCEDURES:  None    CONSULTS:  None    CRITICAL CARE:  None    FINAL IMPRESSION      1.  Nausea vomiting and diarrhea          DISPOSITION / PLAN     DISPOSITION Decision To Discharge 04/14/2022 10:38:02 PM      PATIENT REFERRED TO:  Matilda Jose, APRN  [address] [facility] [id number] [phone]    Schedule an appointment as soon as possible for a visit         DISCHARGE MEDICATIONS:  Discharge Medication List as of 4/14/2022 10:42 PM      START taking these medications    Details   ondansetron (ZOFRAN) 4 MG tablet Take 1 tablet by mouth every 8 hours as needed for Nausea, Disp-20 tablet, R-0Print             Ayad Arellano MD  Emergency Medicine Resident    (Please note that portions of thisnote were completed with a voice recognition program.  Efforts were made to edit the dictations but occasionally words are mis-transcribed.)        Ayad Arellano MD  Resident  04/16/22 1701

## 2022-04-15 NOTE — ED NOTES
SW consulted for transportation assistance for discharged pt. Pt requesting to go to address on file. [employer] utilized.  Trip # [id number]     FLORENTINO Rodriges  04/15/22 Nickolas Granado [address], [facility]  04/15/22 [id number]

## 2022-04-15 NOTE — ED PROVIDER NOTES
[facility]     Emergency Department     Faculty Attestation    I performed a history and physical examination of the patient and discussed management with the resident. I reviewed the residents note and agree with the documented findings and plan of care. Any areas of disagreement are noted on the chart. I was personally present for the key portions of any procedures. I have documented in the chart those procedures where I was not present during the key portions. I have reviewed the emergency nurses triage note. I agree with the chief complaint, past medical history, past surgical history, allergies, medications, social and family history as documented unless otherwise noted below. For Physician Assistant/ Nurse Practitioner cases/documentation I have personally evaluated this patient and have completed at least one if not all key elements of the E/M (history, physical exam, and MDM). Additional findings are as noted. I have personally seen and evaluated the patient. I find the patient's history and physical exam are consistent with the NP/PA documentation. I agree with the care provided, treatment rendered, disposition and follow-up plan. Complains of cough nausea vomiting diarrhea patient is seen comfortably. Lungs are clear bilaterally on exam      Critical Care     Nestor Walker M.D.   Attending Emergency  Physician              Amado Traore MD  04/14/22 [id number]

## 2022-04-16 ASSESSMENT — ENCOUNTER SYMPTOMS
ABDOMINAL DISTENTION: 0
VOMITING: 1
COUGH: 1
CHEST TIGHTNESS: 0
NAUSEA: 1
WHEEZING: 1
TROUBLE SWALLOWING: 0
DIARRHEA: 1
BACK PAIN: 0
SHORTNESS OF BREATH: 0
ABDOMINAL PAIN: 0
FACIAL SWELLING: 0

## 2022-07-08 ENCOUNTER — HOSPITAL ENCOUNTER (EMERGENCY)
Age: 42
Discharge: HOME | End: 2022-07-08
Attending: EMERGENCY MEDICINE
Payer: COMMERCIAL

## 2022-07-08 VITALS
SYSTOLIC BLOOD PRESSURE: 156 MMHG | RESPIRATION RATE: 16 BRPM | HEART RATE: 121 BPM | OXYGEN SATURATION: 97 % | TEMPERATURE: 97.9 F | DIASTOLIC BLOOD PRESSURE: 107 MMHG

## 2022-07-08 DIAGNOSIS — M10.9 GOUTY ARTHRITIS OF LEFT KNEE: Primary | ICD-10-CM

## 2022-07-08 PROCEDURE — 96372 THER/PROPH/DIAG INJ SC/IM: CPT

## 2022-07-08 PROCEDURE — 99284 EMERGENCY DEPT VISIT MOD MDM: CPT

## 2022-07-08 PROCEDURE — 6360000002 HC RX W HCPCS: Performed by: STUDENT IN AN ORGANIZED HEALTH CARE EDUCATION/TRAINING PROGRAM

## 2022-07-08 RX ORDER — COLCHICINE 0.6 MG/1
TABLET ORAL
Qty: 30 TABLET | Refills: 1 | Status: SHIPPED | OUTPATIENT
Start: 2022-07-08

## 2022-07-08 RX ORDER — IBUPROFEN 800 MG/1
800 TABLET ORAL 2 TIMES DAILY PRN
Qty: 20 TABLET | Refills: 0 | Status: SHIPPED | OUTPATIENT
Start: 2022-07-08

## 2022-07-08 RX ORDER — KETOROLAC TROMETHAMINE 30 MG/ML
30 INJECTION, SOLUTION INTRAMUSCULAR; INTRAVENOUS ONCE
Status: COMPLETED | OUTPATIENT
Start: 2022-07-08 | End: 2022-07-08

## 2022-07-08 RX ADMIN — KETOROLAC TROMETHAMINE 30 MG: 30 INJECTION, SOLUTION INTRAMUSCULAR; INTRAVENOUS at 15:02

## 2022-07-08 ASSESSMENT — ENCOUNTER SYMPTOMS
TROUBLE SWALLOWING: 0
COUGH: 0
ABDOMINAL DISTENTION: 0
APNEA: 0
DIARRHEA: 0
NAUSEA: 0
EYE PAIN: 0
EYE DISCHARGE: 0
VOICE CHANGE: 0
VOMITING: 0
EYE ITCHING: 0

## 2022-07-08 ASSESSMENT — PAIN - FUNCTIONAL ASSESSMENT: PAIN_FUNCTIONAL_ASSESSMENT: 0-10

## 2022-07-08 ASSESSMENT — PAIN SCALES - GENERAL: PAINLEVEL_OUTOF10: 8

## 2022-07-08 NOTE — ED PROVIDER NOTES
[facility] ED                                Emergency Department                                               Faculty Attestation     I performed a history and physical examination of the patient and discussed management with the resident. I reviewed the residents note and agree with the documented findings and plan of care. Any areas of disagreement are noted on the chart. I was personally present for the key portions of any procedures. I have documented in the chart those procedures where I was not present during the key portions. I have reviewed the emergency nurses triage note. I agree with the chief complaint, past medical history, past surgical history, allergies, medications, social and family history as documented unless otherwise noted below. For Physician Assistant/ Nurse Practitioner cases/documentation I have personally evaluated this patient and have completed at least one if not all key elements of the E/M (history, physical exam, and MDM). Additional findings are as noted. This patient was evaluated in the Emergency Department for symptoms described in the history of present illness. He/she was evaluated in the context of the global COVID-19 pandemic, which necessitated consideration that the patient might be at risk for infection with the SARS-CoV-2 virus that causes COVID-19. Institutional protocols and algorithms that pertain to the evaluation of patients at risk for COVID-19 are in a state of rapid change based on information released by regulatory bodies including the CDC and federal and state organizations. These policies and algorithms were followed during the patient's care in the ED. 13-year-old male presents emergency department with left knee pain and swelling. Patient has a history of gout states that this feels like a typical gout flare for him. States that he typically uses colchicine and ibuprofen which help with his symptoms.   Has not had any falls or trauma to the area. No fever body aches chills nausea or vomiting. Initial evaluation he is noted to be hypertensive which he does have a history of as well as tachycardic. However on palpation of pulses in the room he is no longer tachycardic and I believe this is more likely due to pain and ambulating from triage. His left knee does have an effusion. Mildly warm to touch with some tenderness. However there is no overlying erythema or bruising. Distally neurovascularly intact. No clinical signs or concerns for septic joint at this time. Does have a history of DVT in the past, was taken off of oral anticoagulations back in 2011 and IVC filter was placed at that time.      Critical Care: NONE    Margie Reese DO  Emergency Medicine Physician        Frank Garg DO  07/08/22 6103

## 2022-07-08 NOTE — ED PROVIDER NOTES
[facility] ED  Emergency Department Encounter  Emergency Medicine Resident     Pt Name: Kerry Burns  NFW:[id number]  [place] 1980  Date of evaluation: 7/8/22  PCP:  ERICA Morgan    CHIEF COMPLAINT       Chief Complaint   Patient presents with    Knee Pain     left knee pain causing difficulty walking, swollen       HISTORY OF PRESENT ILLNESS  (Location/Symptom, Timing/Onset, Context/Setting, Quality, Duration, ModifyingFactors, Severity.)      Kerry Burns is a 38 y.o. male with PMH of gouty arthritis presents to the emergency room for evaluation of a left knee pain. Patient states that he is been having pain over the past week that acutely worsened over the past 2 days as he is been unable to take his Medication secondary to him losing it. Patient states this is typical gout pain, denies recent trauma, falling, infection, illness or sicknesses or recent time. Patient states that the pain is mostly on the left lateral side of the leg and a little bit behind, denies pain when heart palpation is pressed behind his calf. Patient states that otherwise he feels well, states that he does have some difficulty walking but is able to suffer through the pain. PAST MEDICAL / SURGICAL / SOCIAL /FAMILY HISTORY      has a past medical history of DVT, lower extremity ([address] Hypertension, Obesity, PE (pulmonary embolism), Post-thrombotic syndrome, Psychiatric problem, and Unspecified diseases of blood and blood-forming organs. No other pertinent PMH on review with patient/guardian. has a past surgical history that includes Vena Cava Filter Placement. No other pertinent PSH on review with patient/guardian.   Social History     Socioeconomic History    Marital status: Single     Spouse name: Not on file    Number of children: Not on file    Years of education: Not on file    Highest education level: Not on file   Occupational History    Not on file   Tobacco Use    Smoking status: Former Smoker     Packs/day: 0.25     Years: 10.00     Pack years: 2.50     Types: Cigarettes    Smokeless tobacco: Never Used   Substance and Sexual Activity    Alcohol use: Yes     Comment: occas    Drug use: No    Sexual activity: Not on file   Other Topics Concern    Not on file   Social History Narrative    Not on file     Social Determinants of Health     Financial Resource Strain:     Difficulty of Paying Living Expenses: Not on file   Food Insecurity:     Worried About Running Out of Food in the Last Year: Not on file    Anneliese of Food in the Last Year: Not on file   Transportation Needs:     Lack of Transportation (Medical): Not on file    Lack of Transportation (Non-Medical): Not on file   Physical Activity:     Days of Exercise per Week: Not on file    Minutes of Exercise per Session: Not on file   Stress:     Feeling of Stress : Not on file   Social Connections:     Frequency of Communication with Friends and Family: Not on file    Frequency of Social Gatherings with Friends and Family: Not on file    Attends [employer] Services: Not on file    Active Member of [address] [place] [address] [employer] or Organizations: Not on file    Attends Club or Organization Meetings: Not on file    Marital Status: Not on file   Intimate Partner Violence:     Fear of Current or Ex-Partner: Not on file    Emotionally Abused: Not on file    Physically Abused: Not on file    Sexually Abused: Not on file   Housing Stability:     Unable to Pay for Housing in the Last Year: Not on file    Number of [place] in the Last Year: Not on file    Unstable Housing in the Last Year: Not on file       I counseled the patient against using tobacco products. Family History   Problem Relation Age of Onset    Diabetes Mother     Colon Cancer Mother      No other pertinent FamHx on review with patient/guardian. Allergies:  Patient has no known allergies.     Home Medications:  Prior to Admission medications    Medication Sig Start Date End Date Taking? Authorizing Provider   ibuprofen (ADVIL;MOTRIN) 800 MG tablet Take 1 tablet by mouth 2 times daily as needed for Pain 7/8/22  Yes Nadeem Springer MD   colchicine (COLCRYS) 0.6 MG tablet One tablet per hour or every two hours until relief of gouty pain and inflammation, up to a total of 4 mg or until upset stomach occurs 7/8/22  Yes Nadeem Springer MD   ondansetron (ZOFRAN) 4 MG tablet Take 1 tablet by mouth every 8 hours as needed for Nausea 4/14/22   Lissette Kerr MD   rivaroxaban (XARELTO) 15 MG TABS tablet Take 1 tablet by mouth 2 times daily (with meals) 10/15/21 1/13/22  Ed Ayala MD   ibuprofen (ADVIL) 200 MG tablet Take 4 tablets by mouth every 6 hours as needed for Pain 10/15/21 10/30/21  Ed Ayala MD   acetaminophen (TYLENOL) 500 MG tablet Take 2 tablets by mouth 3 times daily 10/1/21   Aretha Chavarria DO   naproxen (NAPROSYN) 250 MG tablet Take 2 tablets by mouth 2 times daily (with meals) 12/14/18 10/1/21  Amador Calero DO       REVIEW OF SYSTEMS    (2-9 systems for level 4, 10 ormore for level 5)      Review of Systems   Constitutional: Positive for activity change. Negative for appetite change and fatigue. HENT: Negative for congestion, trouble swallowing and voice change. Eyes: Negative for pain, discharge and itching. Respiratory: Negative for apnea and cough. Cardiovascular: Negative for chest pain. Gastrointestinal: Negative for abdominal distention, diarrhea, nausea and vomiting. Endocrine: Negative for cold intolerance and heat intolerance. Genitourinary: Negative for dysuria and urgency. Musculoskeletal: Positive for arthralgias, gait problem and joint swelling. Negative for myalgias, neck pain and neck stiffness. Skin: Negative. Neurological: Negative for dizziness and headaches. Psychiatric/Behavioral: Negative for agitation, behavioral problems and confusion.        PHYSICAL EXAM   (up to 7 for level 4, 8 or more for level 5) INITIAL VITALS:   BP (!) 156/107   Pulse (!) 121   Temp 97.9 °F (36.6 °C) (Oral)   Resp 16   SpO2 97%     Physical Exam  Constitutional:       Appearance: Normal appearance. He is not ill-appearing. HENT:      Head: Normocephalic and atraumatic. Nose: Nose normal.      Mouth/Throat:      Mouth: Mucous membranes are moist.      Pharynx: Oropharynx is clear. Eyes:      Extraocular Movements: Extraocular movements intact. Pupils: Pupils are equal, round, and reactive to light. Cardiovascular:      Rate and Rhythm: Normal rate and regular rhythm. Pulses: Normal pulses. Heart sounds: Normal heart sounds. Pulmonary:      Effort: Pulmonary effort is normal.      Breath sounds: Normal breath sounds. Abdominal:      General: Abdomen is flat. There is no distension. Palpations: Abdomen is soft. Tenderness: There is no abdominal tenderness. Musculoskeletal:         General: Swelling and tenderness present. No deformity. Cervical back: Normal range of motion and neck supple. No rigidity. Skin:     General: Skin is warm and dry. Capillary Refill: Capillary refill takes less than 2 seconds. Coloration: Skin is not jaundiced. Findings: No bruising. Neurological:      General: No focal deficit present. Mental Status: He is alert. Psychiatric:         Mood and Affect: Mood normal.         Thought Content: Thought content normal.         DIFFERENTIAL  DIAGNOSIS     DDX: Gouty arthritis, gout flare, osteoarthritis, septic joint    PLAN (LABS / IMAGING / EKG):  No orders of the defined types were placed in this encounter.       MEDICATIONS ORDERED:  Orders Placed This Encounter   Medications    ketorolac (TORADOL) injection 30 mg    ibuprofen (ADVIL;MOTRIN) 800 MG tablet     Sig: Take 1 tablet by mouth 2 times daily as needed for Pain     Dispense:  20 tablet     Refill:  0    colchicine (COLCRYS) 0.6 MG tablet     Sig: One tablet per hour or every two hours until relief of gouty pain and inflammation, up to a total of 4 mg or until upset stomach occurs     Dispense:  30 tablet     Refill:  1           DIAGNOSTIC RESULTS / EMERGENCY DEPARTMENT COURSE / MDM     LABS:  No results found for this visit on 07/08/22. IMPRESSION/MDM/ED COURSE:  38 y.o. male presented with swelling and tenderness to his left knee. Given the patient denies any other constitutional symptoms such as fever, nausea, vomiting or chills or recent infections low concern at this time for septic joint. Patient did run out of his analgesic medication which makes gouty flare more likely. Pain is patient's typical pain in the typical area. We will treat with analgesic medications and discharged with refills for his gout medication and ibuprofen 800s for the short-term            Patient/Guardian requesting discharge. Patient/Guardian was given written and verbal instructions prior to discharge. Patient/Guardian understood and agreed. Patient/Guardian had no further questions. RADIOLOGY:  No orders to display         EKG  None    All EKG's are interpreted by the Emergency Department Physician who either signs or Co-signs this chart in the absence of a cardiologist.      PROCEDURES:  None    CONSULTS:  None        FINAL IMPRESSION      1.  Gouty arthritis of left knee          DISPOSITION / PLAN       DISPOSITION Decision To Discharge 07/08/2022 03:06:30 PM        PATIENT REFERREDTO:  ERICA Shipley  [address]  [phone]    Schedule an appointment as soon as possible for a visit   As needed    [facility] ED  [address] [place] [address]  [phone]  Go to   As needed      DISCHARGE MEDICATIONS:  Current Discharge Medication List          Michelle Ochoa MD  PGY 3  Resident Physician Emergency Medicine  07/08/22 3:25 PM        (Please note that portions of this note were completed with a voice recognition program.Efforts were made to edit the dictations but occasionally words are mis-transcribed.)        Pamela Awan MD  Resident  07/08/22 4511

## 2023-02-20 ENCOUNTER — HOSPITAL ENCOUNTER (EMERGENCY)
Age: 43
Discharge: HOME | End: 2023-02-20
Attending: EMERGENCY MEDICINE
Payer: COMMERCIAL

## 2023-02-20 ENCOUNTER — APPOINTMENT (OUTPATIENT)
Dept: GENERAL RADIOLOGY | Age: 43
End: 2023-02-20
Payer: COMMERCIAL

## 2023-02-20 VITALS
OXYGEN SATURATION: 100 % | DIASTOLIC BLOOD PRESSURE: 100 MMHG | RESPIRATION RATE: 16 BRPM | TEMPERATURE: 99.7 F | SYSTOLIC BLOOD PRESSURE: 155 MMHG | HEART RATE: 101 BPM

## 2023-02-20 DIAGNOSIS — A59.9 TRICHOMONAS INFECTION: Primary | ICD-10-CM

## 2023-02-20 LAB
ANION GAP SERPL CALCULATED.3IONS-SCNC: 10 MMOL/L (ref 9–17)
BACTERIA: ABNORMAL
BILIRUBIN URINE: NEGATIVE
BUN SERPL-MCNC: 16 MG/DL (ref 6–20)
CALCIUM SERPL-MCNC: 9.1 MG/DL (ref 8.6–10.4)
CHLORIDE SERPL-SCNC: 101 MMOL/L (ref 98–107)
CO2 SERPL-SCNC: 25 MMOL/L (ref 20–31)
COLOR: YELLOW
CREAT SERPL-MCNC: 0.9 MG/DL (ref 0.7–1.2)
EPITHELIAL CELLS UA: ABNORMAL /HPF (ref 0–5)
GFR SERPL CREATININE-BSD FRML MDRD: >60 ML/MIN/1.73M2
GLUCOSE SERPL-MCNC: 118 MG/DL (ref 70–99)
GLUCOSE UR STRIP.AUTO-MCNC: NEGATIVE MG/DL
KETONES UR STRIP.AUTO-MCNC: NEGATIVE MG/DL
LEUKOCYTE ESTERASE UR QL STRIP.AUTO: ABNORMAL
NITRITE UR QL STRIP.AUTO: NEGATIVE
POTASSIUM SERPL-SCNC: 4.1 MMOL/L (ref 3.7–5.3)
PROT UR STRIP.AUTO-MCNC: 6 MG/DL (ref 5–8)
PROT UR STRIP.AUTO-MCNC: NEGATIVE MG/DL
RBC CLUMPS #/AREA URNS AUTO: ABNORMAL /HPF (ref 0–2)
SODIUM SERPL-SCNC: 136 MMOL/L (ref 135–144)
SPECIFIC GRAVITY UA: 1.01 (ref 1–1.03)
TRICHOMONAS: ABNORMAL
TURBIDITY: CLEAR
URINE HGB: ABNORMAL
UROBILINOGEN, URINE: NORMAL
WBC UA: ABNORMAL /HPF (ref 0–5)

## 2023-02-20 PROCEDURE — 73562 X-RAY EXAM OF KNEE 3: CPT

## 2023-02-20 PROCEDURE — 87591 N.GONORRHOEAE DNA AMP PROB: CPT

## 2023-02-20 PROCEDURE — 87491 CHLMYD TRACH DNA AMP PROBE: CPT

## 2023-02-20 PROCEDURE — 81001 URINALYSIS AUTO W/SCOPE: CPT

## 2023-02-20 PROCEDURE — 99284 EMERGENCY DEPT VISIT MOD MDM: CPT

## 2023-02-20 PROCEDURE — 73130 X-RAY EXAM OF HAND: CPT

## 2023-02-20 PROCEDURE — 80048 BASIC METABOLIC PNL TOTAL CA: CPT

## 2023-02-20 RX ORDER — COLCHICINE 0.6 MG/1
TABLET ORAL
Qty: 15 TABLET | Refills: 0 | Status: SHIPPED | OUTPATIENT
Start: 2023-02-20

## 2023-02-20 RX ORDER — IBUPROFEN 600 MG/1
600 TABLET ORAL EVERY 6 HOURS PRN
Qty: 30 TABLET | Refills: 0 | Status: SHIPPED | OUTPATIENT
Start: 2023-02-20

## 2023-02-20 RX ORDER — METRONIDAZOLE 500 MG/1
500 TABLET ORAL 2 TIMES DAILY
Qty: 14 TABLET | Refills: 0 | Status: SHIPPED | OUTPATIENT
Start: 2023-02-20 | End: 2023-02-27

## 2023-02-20 ASSESSMENT — ENCOUNTER SYMPTOMS
SHORTNESS OF BREATH: 0
COUGH: 0
ABDOMINAL PAIN: 0

## 2023-02-20 NOTE — ED NOTES
Pt given instructions for follow-up and discharge. Pt verbalizes understanding.  Pt is A&O x4, PWD, eupneic, and ambulatory with steady, even gait upon discharge. '     Preeti Macario RN  02/20/23 1007

## 2023-02-20 NOTE — ED PROVIDER NOTES
[facility]  ED  Emergency Department Encounter  Emergency Medicine Resident     Pt Name:Drew Collier  MRN: [id number]  [place] 1980  Date of evaluation: 2/20/23  PCP:  ERICA Garcia  Note Started: 7:33 AM EST      CHIEF COMPLAINT       Chief Complaint   Patient presents with    Hematuria    Other     Left knee pain & right hand swollen       HISTORY OF PRESENT ILLNESS  (Location/Symptom, Timing/Onset, Context/Setting, Quality, Duration, Modifying Factors, Severity.)      Roverto Smith is a 37 y.o. male who presents with atraumatic left knee pain since yesterday. Patient has a history of gout and states this feels similar to previous gout flares. Complaining of traumatic right middle finger pain of 3 days duration. States he hit his hand on a machine at work. Has had increasing swelling and pain since then. Denies any systemic signs including fevers, chills, chest pain, shortness of breath. He also reports bloody urine. Denies any discharge or ulcers. Medical history includes DVT. States he had a IVC filter placed years ago. No longer on any anticoagulation. Denies any calf swelling or recent immobilization. States he is supposed to be on daily blood pressure medications but is not on any medications on a daily basis. PAST MEDICAL / SURGICAL / SOCIAL / FAMILY HISTORY      has a past medical history of DVT, lower extremity ([address] Hypertension, Obesity, PE (pulmonary embolism), Post-thrombotic syndrome, Psychiatric problem, and Unspecified diseases of blood and blood-forming organs. has a past surgical history that includes Vena Cava Filter Placement.       Social History     Socioeconomic History    Marital status: Single     Spouse name: Not on file    Number of children: Not on file    Years of education: Not on file    Highest education level: Not on file   Occupational History    Not on file   Tobacco Use    Smoking status: Former     Packs/day: 0.25     Years: 10.00     Pack years: 2.50     Types: Cigarettes    Smokeless tobacco: Never   Substance and Sexual Activity    Alcohol use: Yes     Comment: occas    Drug use: No    Sexual activity: Not on file   Other Topics Concern    Not on file   Social History Narrative    Not on file     Social Determinants of Health     Financial Resource Strain: Not on file   Food Insecurity: Not on file   Transportation Needs: Not on file   Physical Activity: Not on file   Stress: Not on file   Social Connections: Not on file   Intimate Partner Violence: Not on file   Housing Stability: Not on file       Family History   Problem Relation Age of Onset    Diabetes Mother     Colon Cancer Mother        Allergies:  Patient has no known allergies. Home Medications:  Prior to Admission medications    Medication Sig Start Date End Date Taking? Authorizing Provider   colchicine (COLCRYS) 0.6 MG tablet One tablet per hour or every two hours until relief of gouty pain and inflammation, up to a total of 4 mg or until upset stomach occurs 2/20/23  Yes Emily Cheney DO   metroNIDAZOLE (FLAGYL) 500 MG tablet Take 1 tablet by mouth 2 times daily for 7 days 2/20/23 2/27/23 Yes Emily Cheney DO   ibuprofen (ADVIL;MOTRIN) 600 MG tablet Take 1 tablet by mouth every 6 hours as needed for Pain 2/20/23  Yes Emily Cheney DO   ondansetron (ZOFRAN) 4 MG tablet Take 1 tablet by mouth every 8 hours as needed for Nausea 4/14/22   Katie Jacob MD   rivaroxaban (XARELTO) 15 MG TABS tablet Take 1 tablet by mouth 2 times daily (with meals) 10/15/21 1/13/22  Rusty Bangura MD   acetaminophen (TYLENOL) 500 MG tablet Take 2 tablets by mouth 3 times daily 10/1/21   Tommie Ulrich DO   naproxen (NAPROSYN) 250 MG tablet Take 2 tablets by mouth 2 times daily (with meals) 12/14/18 10/1/21  Sammi Cooley DO       REVIEW OF SYSTEMS       Review of Systems   Constitutional:  Negative for chills and fever. HENT:  Negative for congestion.     Respiratory:  Negative for cough and shortness of breath. Cardiovascular:  Negative for chest pain. Gastrointestinal:  Negative for abdominal pain. Genitourinary:  Positive for hematuria. Negative for dysuria, flank pain, frequency, genital sores, penile swelling, scrotal swelling and testicular pain. Musculoskeletal:         L knee pain, R middle finger pain     PHYSICAL EXAM      INITIAL VITALS:   BP (!) 155/100   Pulse (!) 101   Temp 99.7 °F (37.6 °C) (Oral)   Resp 16   SpO2 100%     Physical Exam  Vitals reviewed. Constitutional:       General: He is not in acute distress. HENT:      Head: Normocephalic and atraumatic. Ears:      Comments: Hearing grossly normal     Nose: Nose normal.      Mouth/Throat:      Mouth: Mucous membranes are moist.      Pharynx: Oropharynx is clear. Eyes:      General: No scleral icterus. Conjunctiva/sclera: Conjunctivae normal.      Pupils: Pupils are equal, round, and reactive to light. Cardiovascular:      Rate and Rhythm: Normal rate and regular rhythm. Pulses: Normal pulses. Pulmonary:      Effort: Pulmonary effort is normal. No respiratory distress. Breath sounds: Normal breath sounds. Abdominal:      General: There is no distension. Tenderness: There is no abdominal tenderness. There is no guarding. Musculoskeletal:      Cervical back: No muscular tenderness. Right lower leg: Edema present. Left lower leg: Edema present. Comments: Left knee: No gross deformity, + joint swelling w/palpable effusion. Skin intact w/o laceration. + lower extremity swelling  Distal sensation intact, pedal pulses palpable, <2s cap refill. ROM full  Calf compartments soft     Right finger: swelling up to the PIP. No gross deformity,  skin intact w/o laceration. Distal sensation intact, radial pulses palpable, <2s cap refill. ROM full, 5/5  strength  Compartments soft    Skin:     General: Skin is warm and dry.       Capillary Refill: Capillary refill takes less than 2 seconds. Neurological:      General: No focal deficit present. Mental Status: He is alert and oriented to person, place, and time. Mental status is at baseline. DDX/DIAGNOSTIC RESULTS / EMERGENCY DEPARTMENT COURSE / MDM     Medical Decision Making  Amount and/or Complexity of Data Reviewed  Labs: ordered. Decision-making details documented in ED Course. Radiology: ordered. Risk  Prescription drug management. Patti Seymour is a 37 y.o. well appearing man w/hx of gout, HTN and DVT s/p IVC filter presenting for traumatic right digit pain, atraumatic left knee pain and hematuria. No genital ulcers or penile discharge associated, no systemic signs. Will obtain UA, assess kidney function and await XR imaging. Low concern for DVT currently based on exam.     EKG  none    All EKG's are interpreted by the Emergency Department Physician who either signs or Co-signs this chart in the absence of a cardiologist.    EMERGENCY DEPARTMENT COURSE:      ED Course as of 02/22/23 1407   Mon Feb 20, 2023   0845 BUN,BUNPL: 16 [LR]   0845 Creatinine: 0.90 [LR]   0944 Mild degenerative change of the 2nd MCP joint. Mild negative ulnar  variance. 2. No acute fracture or dislocation. Left knee:     1. Mild soft tissue edema about the left knee. 2. No acute fracture or dislocation. [LR]      ED Course User Index  [LR] Emily Cheney DO   Discussed findings w/patient. He was given his previously tolerated home colchicine, understands he may take NSAIDs and tylenol for finger pain. Kidney function is normal. UA shows trichomonas, GC chlamydia pending. He understands to return to the ER for any new or worsening symptoms    PROCEDURES  none    CONSULTS:  None    CRITICAL CARE:  See attending note    FINAL IMPRESSION      1.  Trichomonas infection          DISPOSITION / PLAN     DISPOSITION Decision To Discharge 02/20/2023 09:51:01 AM      PATIENT REFERRED TO:  Vasquez Hankins, APRIRMA  [address] [place] [address] [address] [facility] [address]  [phone]      As needed, If symptoms worsen    [facility]  [address] [facility] [address]  [phone]    As needed, If symptoms worsen    DISCHARGE MEDICATIONS:  Discharge Medication List as of 2/20/2023  9:53 AM        START taking these medications    Details   metroNIDAZOLE (FLAGYL) 500 MG tablet Take 1 tablet by mouth 2 times daily for 7 days, Disp-14 tablet, R-0Print             Theron Braswell DO  Emergency Medicine Resident    (Please note that portions of thisnote were completed with a voice recognition program.  Efforts were made to edit the dictations but occasionally words are mis-transcribed.)       Theron Braswell DO  Resident  02/22/23 0386

## 2023-02-20 NOTE — ED PROVIDER NOTES
[facility] ED     Emergency Department     Faculty Attestation        I performed a history and physical examination of the patient and discussed management with the resident. I reviewed the residents note and agree with the documented findings and plan of care. Any areas of disagreement are noted on the chart. I was personally present for the key portions of any procedures. I have documented in the chart those procedures where I was not present during the key portions. I have reviewed the emergency nurses triage note. I agree with the chief complaint, past medical history, past surgical history, allergies, medications, social and family history as documented unless otherwise noted below. For mid-level providers such as nurse practitioners as well as physicians assistants:    I have personally seen and evaluated the patient. I find the patient's history and physical exam are consistent with NP/PA documentation. I agree with the care provided, treatment rendered, disposition, & follow-up plan. Additional findings are as noted. Vital Signs: BP (!) 155/100   Pulse (!) 101   Temp 99.7 °F (37.6 °C) (Oral)   Resp 16   SpO2 100%   PCP:  Huang Snow, ERICA    Pertinent Comments:     Presents to the emergency department for evaluation of multiple complaints his main complaint is left hand pain he states he hit it while at work and has diffuse pain. Also complaining his left knee pain. States he has a history of gout and this feels like his typical gout exacerbation. His final complaint is some mild hematuria. He states he thinks he sees blood in his urine but denies any hematuria dysuria flank pain weight loss fevers or chills. On exam he is afebrile nontoxic resting comfortably no acute distress. His skin is diffusely swollen with there is no bruising ecchymosis or deformity.   He has left knee is also also diffusely tender with there is no erythema or warmth he is able to walk and ambulate with no assistance. Low clinical suspicion for septic joint any years currently very well here. Will check x-ray of the left knee, hand, urinalysis laboratory studies, pain control, reassessment.       Critical Care  None          Mecca Lala MD    Attending Emergency Medicine Physician            Millie Weinstein MD  02/20/23 7790

## 2023-02-20 NOTE — ED NOTES
Pt presented to ED for the complaint of dysuria. Pt states the pain comes and goes. Pt states sometimes blood noted in urine. Pt denies abd pain. Pt states also he is having left knee pain and right hand swelling.       Clark Klein RN  02/20/23 9653

## 2023-02-20 NOTE — DISCHARGE INSTRUCTIONS
You are seen in the emergency department for left knee pain likely secondary to a gout flare. Please take your colchicine as previously prescribed. Your hand x-ray was normal and did not show any evidence of fracture. There are some degenerative changes related to arthritis. You may take NSAIDs such as ibuprofen for this. Your urine shows evidence of trichomonas infection. Please take metronidazole as prescribed. You have a gonorrhea and Chlamydia test that are currently pending. You will receive a phone call if these turn positive and a additional antibiotic will be called to your pharmacy. If you experience any new or worsening symptoms please return to the emergency department for reassessment. Medications: You may take tylenol 1,000mg by mouth every 6 hours as needed for pain. Do not exceed 4,000mg per day. If you have liver disease don't take tylenol. You may also take ibuprofen 600mg every 6-8 hours as needed for pain. Do not exceed 2,400 mg per day. If you experience stomach pain or you have a history of kidney disease stop taking ibuprofen. You may alternate application of ice and heat 20 minutes at a time as desired. Take colchicine as previously directed  Take metronidazole for trichomonas infection as directed.

## 2023-02-21 LAB
CHLAMYDIA DNA UR QL NAA+PROBE: NEGATIVE
N GONORRHOEA DNA UR QL NAA+PROBE: NEGATIVE
SPECIMEN DESCRIPTION: NORMAL

## 2023-06-21 NOTE — CONSULTS
Today's Date: 10/14/2021  Patient Name: Enid Saldivar  Date of admission: 10/14/2021 12:24 PM  Patient's age: 35 y.o., 1980  Admission Dx: DVT, lower extremity, distal, acute, left ([address] [I82.4Z2]  Acute deep vein thrombosis (DVT) of left peroneal vein ([address] [I82.452]    Reason for Consult: management recommendations  Requesting Physician: Milady Botello MD    CHIEF COMPLAINT: Acute DVT    History Obtained From:  patient    HISTORY OF PRESENT ILLNESS:      The patient is a 35 y.o.  male who is admitted to the hospital for leg pain and what he thought is a flare of his gout. Patient has chronic gout which flares frequently. He is on colchicine. The patient has previous history of DVT/PE diagnosed in 2004 and has been on warfarin for extended period of time. In 2012, he underwent IVC filter insertion. After that he stopped taking anticoagulation has been observed. I see multiple scans in 2015 that shows chronic echoes in the popliteal vein without clear evidence of acute deep venous thrombosis. While he is in the emergency room, a Doppler study was done and showed evidence of acute venous thrombosis in the left posterior tibial vein. Extensive varicosities were appreciated. Patient is admitted to the hospital, he is started on rivaroxaban and has been handling them well. He is also on colchicine. The patient continues to complain of diffuse aches and pains specially in his leg and also in his hands. It is a typical flare of his gout. He otherwise feels well and has no complaints. Past Medical History:   has a past medical history of DVT, lower extremity ([address] Hypertension, Obesity, PE (pulmonary embolism), Post-thrombotic syndrome, Psychiatric problem, and Unspecified diseases of blood and blood-forming organs. Past Surgical History:   has a past surgical history that includes Vena Cava Filter Placement.      Medications:    Reviewed in Epic     Allergies:  Patient has no known allergies. Social History:   reports that he has quit smoking. His smoking use included cigarettes. He has a 2.50 pack-year smoking history. He has never used smokeless tobacco. He reports current alcohol use. He reports that he does not use drugs. Family History: family history includes Colon Cancer in his mother; Diabetes in his mother. REVIEW OF SYSTEMS:    Constitutional: No fever or chills. No night sweats, no weight loss   Eyes: No eye discharge, double vision, or eye pain   HEENT: negative for sore mouth, sore throat, hoarseness and voice change   Respiratory: negative for cough , sputum, dyspnea, wheezing, hemoptysis, chest pain   Cardiovascular: negative for chest pain, dyspnea, palpitations, orthopnea, PND   Gastrointestinal: negative for nausea, vomiting, diarrhea, constipation, abdominal pain, Dysphagia, hematemesis and hematochezia   Genitourinary: negative for frequency, dysuria, nocturia, urinary incontinence, and hematuria   Integument: negative for rash, skin lesions, bruises.    Hematologic/Lymphatic: negative for easy bruising, bleeding, lymphadenopathy, or petechiae   Endocrine: negative for heat or cold intolerance,weight changes, change in bowel habits and hair loss   Musculoskeletal: Left leg pain as well as joint pain in both hands and feet as discussed  Neurological: negative for headaches, dizziness, seizures, weakness, numbness    PHYSICAL EXAM:      BP (!) 175/99   Pulse 92   Temp 97.7 °F (36.5 °C)   Resp 20   Ht 6' 2\" (1.88 m)   Wt 300 lb (136.1 kg)   SpO2 99%   BMI 38.52 kg/m²    Temp (24hrs), Av.8 °F (36.6 °C), Min:97.7 °F (36.5 °C), Max:98.1 °F (36.7 °C)    General appearance - well appearing, no in pain or distress   Mental status - alert and cooperative   Eyes - pupils equal and reactive, extraocular eye movements intact   Ears - bilateral TM's and external ear canals normal   Mouth - mucous membranes moist, pharynx normal without lesions   Neck - supple, no significant adenopathy   Lymphatics - no palpable lymphadenopathy, no hepatosplenomegaly   Chest - clear to auscultation, no wheezes, rales or rhonchi, symmetric air entry   Heart - normal rate, regular rhythm, normal S1, S2, no murmurs  Abdomen - soft, nontender, nondistended, no masses or organomegaly   Neurological - alert, oriented, normal speech, no focal findings or movement disorder noted   Musculoskeletal - no joint tenderness, deformity or swelling   Extremities - peripheral pulses normal, no pedal edema, no clubbing or cyanosis, varicosities in the left lower extremity  Skin - normal coloration and turgor, no rashes, no suspicious skin lesions noted ,    DATA:    Labs:   CBC: No results for input(s): WBC, HGB, HCT, PLT in the last 72 hours. BMP: Recent Labs     10/14/21  1626      K 4.1   CO2 27   BUN 8   CREATININE 0.85   LABGLOM >60   GLUCOSE 167*     PT/INR: No results for input(s): PROTIME, INR in the last 72 hours. IMAGING DATA:      Primary Problem  <principal problem not specified>    Active Hospital Problems    Diagnosis Date Noted    DVT, lower extremity, distal, acute, left ([facility] [I82.4Z2] 10/14/2021         IMPRESSION:   1. History of DVT PE  2. Status post IVC filter insertion  3. What seems to be unprovoked acute left DVT below knee    RECOMMENDATIONS:  1. Agree with anticoagulation  2. This is the second episode of venous thromboembolism, it seems to be unprovoked. Hypercoagulable work-up will be done as an outpatient  3. If the patient is stable, okay to discharge tomorrow, I will see him as an outpatient in my office at [facility] in 2 months, hypercoagulable work-up should not be done in the setting of acute thrombosis  Please help the patient make an appointment before discharge, my office number is [phone]    Discussed with patient and Nurse. Thank you for asking us to see this patient.     Gabriella Ortega MD  Hematologist/Medical Oncologist  Cell: [phone] Sarecycline Counseling: Patient advised regarding possible photosensitivity and discoloration of the teeth, skin, lips, tongue and gums.  Patient instructed to avoid sunlight, if possible.  When exposed to sunlight, patients should wear protective clothing, sunglasses, and sunscreen.  The patient was instructed to call the office immediately if the following severe adverse effects occur:  hearing changes, easy bruising/bleeding, severe headache, or vision changes.  The patient verbalized understanding of the proper use and possible adverse effects of sarecycline.  All of the patient's questions and concerns were addressed.

## 2023-11-09 ENCOUNTER — HOSPITAL ENCOUNTER (EMERGENCY)
Age: 43
Discharge: HOME | End: 2023-11-09
Attending: EMERGENCY MEDICINE
Payer: COMMERCIAL

## 2023-11-09 ENCOUNTER — APPOINTMENT (OUTPATIENT)
Dept: GENERAL RADIOLOGY | Age: 43
End: 2023-11-09
Payer: COMMERCIAL

## 2023-11-09 VITALS
DIASTOLIC BLOOD PRESSURE: 98 MMHG | HEART RATE: 115 BPM | TEMPERATURE: 98.5 F | OXYGEN SATURATION: 99 % | RESPIRATION RATE: 18 BRPM | SYSTOLIC BLOOD PRESSURE: 150 MMHG

## 2023-11-09 DIAGNOSIS — M25.572 ACUTE LEFT ANKLE PAIN: Primary | ICD-10-CM

## 2023-11-09 DIAGNOSIS — S82.55XA CLOSED NONDISPLACED FRACTURE OF MEDIAL MALLEOLUS OF LEFT TIBIA, INITIAL ENCOUNTER: ICD-10-CM

## 2023-11-09 PROCEDURE — 73590 X-RAY EXAM OF LOWER LEG: CPT

## 2023-11-09 PROCEDURE — 96372 THER/PROPH/DIAG INJ SC/IM: CPT

## 2023-11-09 PROCEDURE — 99284 EMERGENCY DEPT VISIT MOD MDM: CPT

## 2023-11-09 PROCEDURE — 6360000002 HC RX W HCPCS: Performed by: STUDENT IN AN ORGANIZED HEALTH CARE EDUCATION/TRAINING PROGRAM

## 2023-11-09 PROCEDURE — 73630 X-RAY EXAM OF FOOT: CPT

## 2023-11-09 PROCEDURE — 73610 X-RAY EXAM OF ANKLE: CPT

## 2023-11-09 RX ORDER — KETOROLAC TROMETHAMINE 30 MG/ML
30 INJECTION, SOLUTION INTRAMUSCULAR; INTRAVENOUS ONCE
Status: COMPLETED | OUTPATIENT
Start: 2023-11-09 | End: 2023-11-09

## 2023-11-09 RX ORDER — ACETAMINOPHEN 500 MG
1000 TABLET ORAL 3 TIMES DAILY
Qty: 20 TABLET | Refills: 0 | Status: SHIPPED | OUTPATIENT
Start: 2023-11-09

## 2023-11-09 RX ORDER — IBUPROFEN 600 MG/1
600 TABLET ORAL EVERY 6 HOURS PRN
Qty: 30 TABLET | Refills: 0 | Status: SHIPPED | OUTPATIENT
Start: 2023-11-09

## 2023-11-09 RX ADMIN — KETOROLAC TROMETHAMINE 30 MG: 30 INJECTION, SOLUTION INTRAMUSCULAR; INTRAVENOUS at 18:17

## 2023-11-09 ASSESSMENT — PAIN DESCRIPTION - ORIENTATION: ORIENTATION: LEFT

## 2023-11-09 ASSESSMENT — ENCOUNTER SYMPTOMS: SHORTNESS OF BREATH: 0

## 2023-11-09 ASSESSMENT — PAIN - FUNCTIONAL ASSESSMENT: PAIN_FUNCTIONAL_ASSESSMENT: 0-10

## 2023-11-09 ASSESSMENT — PAIN DESCRIPTION - LOCATION: LOCATION: ANKLE

## 2023-11-09 ASSESSMENT — PAIN SCALES - GENERAL: PAINLEVEL_OUTOF10: 10

## 2023-11-09 NOTE — ED NOTES
Pt arrives via triage due to left ankle and leg swelling. Pt states he tripped on something on a stair case Saturday 11/4/23. Pt states the swelling in ankle and leg have gotten worse. Pt does have pain in the ankle. Pt states no redness or bruising. Pt has hx of gout. States this feels different than gout.       Will Tenorio RN  11/09/23 1800

## 2023-11-14 ENCOUNTER — OFFICE VISIT (OUTPATIENT)
Dept: ORTHOPEDIC SURGERY | Age: 43
End: 2023-11-14

## 2023-11-14 VITALS — HEIGHT: 74 IN | BODY MASS INDEX: 38.5 KG/M2 | WEIGHT: 300 LBS

## 2023-11-14 DIAGNOSIS — M10.00 ACUTE IDIOPATHIC GOUT, UNSPECIFIED SITE: ICD-10-CM

## 2023-11-14 DIAGNOSIS — R52 PAIN: ICD-10-CM

## 2023-11-14 DIAGNOSIS — M25.572 ACUTE LEFT ANKLE PAIN: Primary | ICD-10-CM

## 2023-11-14 DIAGNOSIS — Z86.711 HX PULMONARY EMBOLISM: ICD-10-CM

## 2023-11-14 DIAGNOSIS — R03.0 ELEVATED BLOOD PRESSURE READING: ICD-10-CM

## 2023-11-14 RX ORDER — COLCHICINE 0.6 MG/1
0.6 TABLET ORAL DAILY
Qty: 15 TABLET | Refills: 1 | Status: SHIPPED | OUTPATIENT
Start: 2023-11-14

## 2023-11-14 ASSESSMENT — ENCOUNTER SYMPTOMS
SHORTNESS OF BREATH: 0
VOMITING: 0
NAUSEA: 0

## 2023-11-14 NOTE — PROGRESS NOTES
LEFT (2 VIEWS) 11/09/2023    Narrative  EXAMINATION:  2 XRAY VIEWS OF THE LEFT TIBIA AND FIBULA    11/9/2023 8:55 pm    COMPARISON:  None. HISTORY:  ORDERING SYSTEM PROVIDED HISTORY: medial mal fx, eval maissoneuve fx  TECHNOLOGIST PROVIDED HISTORY:  medial mal fx, eval maissoneuve fx  Reason for Exam: fall down stairs    FINDINGS:  There is no acute fracture. There is cortical irregularity along the medial  malleolus, possibly prior fracture. The alignment is normal.  There is small  osteochondroma in the medial aspect of the mid tibia. Impression  No acute fracture. Narrative & Impression  EXAMINATION:  THREE XRAY VIEWS OF THE LEFT ANKLE     11/9/2023 6:20 pm     COMPARISON:  None. HISTORY:  ORDERING SYSTEM PROVIDED HISTORY: stumbled on stairs saturday, continued 5th  metatarsal pain and lateral mal pain  TECHNOLOGIST PROVIDED HISTORY:  stumbled on stairs saturday, continued 5th metatarsal pain and lateral mal  pain     FINDINGS:  Cortical irregularities medial malleolus. .  Soft tissue swelling. IMPRESSION:  Probable medial malleolar fracture, age indeterminate. Narrative & Impression  EXAMINATION:  THREE XRAY VIEWS OF THE LEFT FOOT     11/9/2023 6:20 pm     COMPARISON:  August 26, 2021 left foot     HISTORY:  ORDERING SYSTEM PROVIDED HISTORY: stumbled on stairs saturday, continued 5th  metatarsal pain and lateral mal pain  TECHNOLOGIST PROVIDED HISTORY:  stumbled on stairs saturday, continued 5th metatarsal pain and lateral mal  pain     FINDINGS:  Diffuse soft tissue swelling. Cortical margins intact. Alignment anatomic. IMPRESSION:  No fracture noted          ASSESSMENT:     1. Acute left ankle pain    2. Pain    3. Elevated blood pressure reading    4. Hx pulmonary embolism    5. Acute idiopathic gout, unspecified site         PLAN:     The patient presents today for evaluation of his left foot and ankle pain.   He has had this pain and swelling for approximately 1 week now after he

## 2024-01-01 ENCOUNTER — HOSPITAL ENCOUNTER (EMERGENCY)
Age: 44
Discharge: HOME | End: 2024-01-01
Attending: EMERGENCY MEDICINE
Payer: COMMERCIAL

## 2024-01-01 VITALS
DIASTOLIC BLOOD PRESSURE: 99 MMHG | SYSTOLIC BLOOD PRESSURE: 150 MMHG | TEMPERATURE: 97.9 F | OXYGEN SATURATION: 98 % | HEART RATE: 124 BPM | RESPIRATION RATE: 18 BRPM

## 2024-01-01 DIAGNOSIS — M10.9 ACUTE GOUT OF RIGHT FOOT, UNSPECIFIED CAUSE: Primary | ICD-10-CM

## 2024-01-01 PROCEDURE — 99283 EMERGENCY DEPT VISIT LOW MDM: CPT

## 2024-01-01 PROCEDURE — 6370000000 HC RX 637 (ALT 250 FOR IP)

## 2024-01-01 RX ORDER — IBUPROFEN 800 MG/1
800 TABLET ORAL ONCE
Status: COMPLETED | OUTPATIENT
Start: 2024-01-01 | End: 2024-01-01

## 2024-01-01 RX ORDER — COLCHICINE 0.6 MG/1
0.6 TABLET ORAL ONCE
Status: COMPLETED | OUTPATIENT
Start: 2024-01-01 | End: 2024-01-01

## 2024-01-01 RX ORDER — COLCHICINE 0.6 MG/1
0.6 TABLET ORAL DAILY
Qty: 30 TABLET | Refills: 0 | Status: SHIPPED | OUTPATIENT
Start: 2024-01-01

## 2024-01-01 RX ORDER — IBUPROFEN 800 MG/1
800 TABLET ORAL EVERY 6 HOURS PRN
Qty: 21 TABLET | Refills: 0 | Status: SHIPPED | OUTPATIENT
Start: 2024-01-01

## 2024-01-01 RX ADMIN — COLCHICINE 0.6 MG: 0.6 TABLET, FILM COATED ORAL at 11:45

## 2024-01-01 RX ADMIN — IBUPROFEN 800 MG: 800 TABLET, FILM COATED ORAL at 11:16

## 2024-01-01 ASSESSMENT — PAIN SCALES - GENERAL: PAINLEVEL_OUTOF10: 9

## 2024-01-01 ASSESSMENT — PAIN - FUNCTIONAL ASSESSMENT: PAIN_FUNCTIONAL_ASSESSMENT: 0-10

## 2024-01-01 ASSESSMENT — PAIN DESCRIPTION - LOCATION: LOCATION: ANKLE

## 2024-01-01 ASSESSMENT — PAIN DESCRIPTION - ORIENTATION: ORIENTATION: RIGHT

## 2024-01-01 NOTE — ED PROVIDER NOTES
[facility] ED  Emergency Department Encounter  Emergency Medicine Resident     Pt Name:Drew Gabriel  MRN: [id number]  Birthdate 1980  Date of evaluation: 1/1/24  PCP:  Kelsea Snow APRN  Note Started: 11:00 AM EST      CHIEF COMPLAINT       No chief complaint on file.      HISTORY OF PRESENT ILLNESS  (Location/Symptom, Timing/Onset, Context/Setting, Quality, Duration, Modifying Factors, Severity.)      Drew Gabriel is a 43 y.o. male who presents with concern for gout flareup.  Patient states over the last 2 to 3 days he has been having significant pain in his right ankle and foot.  He states this feels exactly like his typical gout flare.  He states that he ran out of his colchicine and Motrin and is unable to get a hold of his doctor due to the new year.  He denies any fever, chills, trauma to the foot, or any other complaints.    PAST MEDICAL / SURGICAL / SOCIAL / FAMILY HISTORY      has a past medical history of DVT, lower extremity (HCC), Hypertension, Obesity, PE (pulmonary embolism), Post-thrombotic syndrome, Psychiatric problem, and Unspecified diseases of blood and blood-forming organs.       has a past surgical history that includes Vena Cava Filter Placement.      Social History     Socioeconomic History    Marital status: Single     Spouse name: Not on file    Number of children: Not on file    Years of education: Not on file    Highest education level: Not on file   Occupational History    Not on file   Tobacco Use    Smoking status: Former     Current packs/day: 0.25     Average packs/day: 0.3 packs/day for 10.0 years (2.5 ttl pk-yrs)     Types: Cigarettes    Smokeless tobacco: Never   Substance and Sexual Activity    Alcohol use: Yes     Comment: occas    Drug use: No    Sexual activity: Not on file   Other Topics Concern    Not on file   Social History Narrative    Not on file     Social Determinants of Health     Financial Resource Strain: Not on file   Food

## 2024-01-01 NOTE — ED TRIAGE NOTES
Pt to ED for a medication refill for his gout medication and R ankle pain. Pt states ankle pain is a gout flair up and needs a new prescription. Pt denies other complaints.

## 2024-01-01 NOTE — ED PROVIDER NOTES
[facility] ED     Emergency Department     Faculty Attestation        I performed a history and physical examination of the patient and discussed management with the resident. I reviewed the resident’s note and agree with the documented findings and plan of care. Any areas of disagreement are noted on the chart. I was personally present for the key portions of any procedures. I have documented in the chart those procedures where I was not present during the key portions. I have reviewed the emergency nurses triage note. I agree with the chief complaint, past medical history, past surgical history, allergies, medications, social and family history as documented unless otherwise noted below.    For mid-level providers such as nurse practitioners as well as physicians assistants:    I have personally seen and evaluated the patient.    I find the patient's history and physical exam are consistent with NP/PA documentation.  I agree with the care provided, treatment rendered, disposition, & follow-up plan.     Additional findings are as noted.    Vital Signs: There were no vitals taken for this visit.  PCP:  Kelsea Snow APRN    Pertinent Comments:           Critical Care  None          Lei Calvillo MD    Attending Emergency Medicine Physician            Jered Calvillo MD  01/01/24 7057

## 2024-01-01 NOTE — DISCHARGE INSTRUCTIONS
You were seen today in the emergency department for your ankle pain.  We have evaluated you and determined that you likely have a gout flare.  We now feel you are safe for discharge home.  We have refilled your colchicine and Motrin.    Please return to the emergency department immediately if develop any new or worsening concerns including chest pain, shortness of breath, abdominal pain, nausea, vomiting, diarrhea, weakness, loss consciousness, fever, chills, or any other concerns.    Please call your PCP and schedule appointment within the next 24 to 48 hours for follow-up.

## 2024-02-08 ENCOUNTER — HOSPITAL ENCOUNTER (EMERGENCY)
Age: 44
Discharge: HOME | End: 2024-02-08
Attending: EMERGENCY MEDICINE
Payer: COMMERCIAL

## 2024-02-08 VITALS
HEART RATE: 103 BPM | SYSTOLIC BLOOD PRESSURE: 142 MMHG | DIASTOLIC BLOOD PRESSURE: 89 MMHG | BODY MASS INDEX: 39.36 KG/M2 | RESPIRATION RATE: 18 BRPM | TEMPERATURE: 99.2 F | WEIGHT: 306.66 LBS | OXYGEN SATURATION: 97 % | HEIGHT: 74 IN

## 2024-02-08 DIAGNOSIS — M54.50 RIGHT LOW BACK PAIN, UNSPECIFIED CHRONICITY, UNSPECIFIED WHETHER SCIATICA PRESENT: Primary | ICD-10-CM

## 2024-02-08 LAB
BACTERIA URNS QL MICRO: ABNORMAL
BILIRUB UR QL STRIP: NEGATIVE
CASTS #/AREA URNS LPF: ABNORMAL /LPF (ref 0–8)
CLARITY UR: CLEAR
COLOR UR: YELLOW
EPI CELLS #/AREA URNS HPF: ABNORMAL /HPF (ref 0–5)
GLUCOSE UR STRIP-MCNC: NEGATIVE MG/DL
HGB UR QL STRIP.AUTO: NEGATIVE
KETONES UR STRIP-MCNC: NEGATIVE MG/DL
LEUKOCYTE ESTERASE UR QL STRIP: NEGATIVE
NITRITE UR QL STRIP: NEGATIVE
PH UR STRIP: 6.5 [PH] (ref 5–8)
PROT UR STRIP-MCNC: ABNORMAL MG/DL
RBC #/AREA URNS HPF: ABNORMAL /HPF (ref 0–4)
SP GR UR STRIP: 1.02 (ref 1–1.03)
UROBILINOGEN UR STRIP-ACNC: NORMAL EU/DL (ref 0–1)
WBC #/AREA URNS HPF: ABNORMAL /HPF (ref 0–5)

## 2024-02-08 PROCEDURE — 99284 EMERGENCY DEPT VISIT MOD MDM: CPT

## 2024-02-08 PROCEDURE — 6370000000 HC RX 637 (ALT 250 FOR IP)

## 2024-02-08 PROCEDURE — 81001 URINALYSIS AUTO W/SCOPE: CPT

## 2024-02-08 PROCEDURE — 6360000002 HC RX W HCPCS

## 2024-02-08 PROCEDURE — 87086 URINE CULTURE/COLONY COUNT: CPT

## 2024-02-08 PROCEDURE — 96372 THER/PROPH/DIAG INJ SC/IM: CPT

## 2024-02-08 RX ORDER — ACETAMINOPHEN 500 MG
500 TABLET ORAL 4 TIMES DAILY PRN
Qty: 12 TABLET | Refills: 0 | Status: SHIPPED | OUTPATIENT
Start: 2024-02-08 | End: 2024-02-11

## 2024-02-08 RX ORDER — LIDOCAINE 4 G/G
1 PATCH TOPICAL DAILY
Qty: 10 PATCH | Refills: 0 | Status: SHIPPED | OUTPATIENT
Start: 2024-02-08 | End: 2024-02-18

## 2024-02-08 RX ORDER — IBUPROFEN 600 MG/1
600 TABLET ORAL 3 TIMES DAILY PRN
Qty: 9 TABLET | Refills: 0 | Status: SHIPPED | OUTPATIENT
Start: 2024-02-08 | End: 2024-02-11

## 2024-02-08 RX ORDER — ORPHENADRINE CITRATE 30 MG/ML
60 INJECTION INTRAMUSCULAR; INTRAVENOUS ONCE
Status: COMPLETED | OUTPATIENT
Start: 2024-02-08 | End: 2024-02-08

## 2024-02-08 RX ORDER — ACETAMINOPHEN 500 MG
1000 TABLET ORAL ONCE
Status: COMPLETED | OUTPATIENT
Start: 2024-02-08 | End: 2024-02-08

## 2024-02-08 RX ORDER — IBUPROFEN 800 MG/1
800 TABLET ORAL ONCE
Status: COMPLETED | OUTPATIENT
Start: 2024-02-08 | End: 2024-02-08

## 2024-02-08 RX ORDER — LIDOCAINE 4 G/G
1 PATCH TOPICAL DAILY
Status: DISCONTINUED | OUTPATIENT
Start: 2024-02-08 | End: 2024-02-08 | Stop reason: HOSPADM

## 2024-02-08 RX ORDER — PREDNISONE 20 MG/1
20 TABLET ORAL 2 TIMES DAILY
Qty: 10 TABLET | Refills: 0 | Status: SHIPPED | OUTPATIENT
Start: 2024-02-08 | End: 2024-02-13

## 2024-02-08 RX ADMIN — IBUPROFEN 800 MG: 800 TABLET, FILM COATED ORAL at 15:58

## 2024-02-08 RX ADMIN — ORPHENADRINE CITRATE 60 MG: 60 INJECTION INTRAMUSCULAR; INTRAVENOUS at 17:14

## 2024-02-08 RX ADMIN — ACETAMINOPHEN 1000 MG: 500 TABLET ORAL at 15:58

## 2024-02-08 ASSESSMENT — ENCOUNTER SYMPTOMS
DIARRHEA: 0
ABDOMINAL PAIN: 0
SHORTNESS OF BREATH: 0
VOMITING: 0
NAUSEA: 0
COUGH: 0

## 2024-02-08 ASSESSMENT — PAIN - FUNCTIONAL ASSESSMENT: PAIN_FUNCTIONAL_ASSESSMENT: 0-10

## 2024-02-08 ASSESSMENT — PAIN DESCRIPTION - LOCATION: LOCATION: BACK

## 2024-02-08 ASSESSMENT — PAIN SCALES - GENERAL: PAINLEVEL_OUTOF10: 5

## 2024-02-08 NOTE — ED NOTES
Patient in need of transport arrangements home. Contacted [facility] and arranged trasport, confirmation number is [phone].   Patient notified ride scheduled and can take up to 2 hrs.

## 2024-02-08 NOTE — DISCHARGE INSTRUCTIONS
Call today or tomorrow to follow up with Kelsea Snow APRN  in 2 days.    Use an ice pack or bag filled with ice and apply to the injured area 3 - 4 times a day for 15 - 20 minutes each time.  If the injury is older than 3 days, then use a heating pad to help relax the muscles in your back.    Use ibuprofen or Tylenol (unless prescribed medications that have Tylenol in it) for pain.  You can take over the counter Ibuprofen (advil) tablets (4 tablets every 8 hours or 3 tablets every 6 hours or 2 tablets every 4 hours)    Pablo' Flexion Exercises     1. Pelvic tilt. Lie on your back with knees bent, feet flat on floor. Flatten the small of your back against the floor, without pushing down with the legs. Hold for 5 to 10 seconds.     2. Single Knee to chest. Lie on your back with knees bent and feet flat on the floor. Slowly pull your right knee toward your shoulder and hold 5 to 10 seconds. Lower the knee and repeat with the other knee.     3. Double knee to chest. Begin as in the previous exercise. After pulling right knee to chest, pull left knee to chest and hold both knees for 5 to 10 seconds. Slowly lower one leg at a time.     4. Partial sit-up. Do the pelvic tilt (exercise 1) and, while holding this position, slowly curl your head and shoulders off the floor. Hold briefly. Return slowly to the starting position.     5. Hamstring stretch. Start in long sitting with toes directed toward the ceiling and knees fully extended. Slowly lower the trunk forward over the legs, keeping knees extended, arms outstretched over the legs, and eyes focus ahead.     6. Hip Flexor stretch. Place one foot in front of the other with the left (front) knee flexed and the right (back) knee held rigidly straight. Flex forward through the trunk until the left knee contacts the axillary fold (arm pit region). Repeat with right leg forward and left leg back.     7. Squat. Stand with both feet parallel, about shoulder's width  apart. Attempting to maintain the trunk as perpendicular as possible to the floor, eyes focused ahead, and feet flat on the floor, the subject slowly lowers his body by flexing his knees    Return to the Emergency Department for inability to move legs, worsening of pain, tingling / loss of sensation, any other care or concern.

## 2024-02-08 NOTE — ED PROVIDER NOTES
[facility] ED     Emergency Department     Faculty Attestation        I performed a history and physical examination of the patient and discussed management with the resident. I reviewed the resident’s note and agree with the documented findings and plan of care. Any areas of disagreement are noted on the chart. I was personally present for the key portions of any procedures. I have documented in the chart those procedures where I was not present during the key portions. I have reviewed the emergency nurses triage note. I agree with the chief complaint, past medical history, past surgical history, allergies, medications, social and family history as documented unless otherwise noted below.    For mid-level providers such as nurse practitioners as well as physicians assistants:    I have personally seen and evaluated the patient.    I find the patient's history and physical exam are consistent with NP/PA documentation.  I agree with the care provided, treatment rendered, disposition, & follow-up plan.     Additional findings are as noted.    Vital Signs: BP (!) 142/89   Pulse (!) 111   Temp 99.2 °F (37.3 °C) (Oral)   Resp 18   Ht 1.88 m (6' 2\")   Wt (!) 139.1 kg (306 lb 10.6 oz)   SpO2 97%   BMI 39.37 kg/m²   PCP:  Kelsea Snow APRN    Pertinent Comments:     Patient with some paraspinal lumbar tenderness he has no midline tenderness.  About history of your treat urinary tract infection but denies any hematuria dysuria abdominal pain he is afebrile nontoxic right lumbar paraspinal tenderness no rash noted.  Abdomen soft and nontender with no pulsatile abdominal mass      Critical Care  None          Lei Calvillo MD    Attending Emergency Medicine Physician            Jered Calvillo MD  02/08/24 4036

## 2024-02-08 NOTE — ED PROVIDER NOTES
[facility] ED  Emergency Department Encounter  Emergency Medicine Resident     Pt Name:Drew Gabriel  MRN: [id number]  Birthdate 1980  Date of evaluation: 2/8/24  PCP:  Kelsea Snow APRN  Note Started: 3:14 PM EST      CHIEF COMPLAINT       Chief Complaint   Patient presents with    Back Pain       HISTORY OF PRESENT ILLNESS  (Location/Symptom, Timing/Onset, Context/Setting, Quality, Duration, Modifying Factors, Severity.)      Drew Gabriel is a 44 y.o. male who presents with right-sided back pain that started on Sunday.  Patient reports that his back pain has been getting progressively worse.  States that he took some Flexeril yesterday however no relief.  Patient denies recent trauma or change in his gait.  Patient reports that it was difficult for him to fall asleep yesterday due to the pain, he did take some Flexeril without relief.  Has not attempted any other medications for pain.  Denies hematuria, dysuria, frequency, urgency, fevers, chills, abdominal pain radiate anywhere.  No previous history of back pain.  No urinary or bowel incontinence/retention, no saddle anesthesia.  No weakness in bilateral lower extremities, no change in sensation in bilateral lower extremities, no recent falls.  Pain is exacerbated by standing from a sitting position.  Patient reports that he is only on colchicine for gout.  No known drug allergies    PAST MEDICAL / SURGICAL / SOCIAL / FAMILY HISTORY      has a past medical history of DVT, lower extremity (HCC), Hypertension, Obesity, PE (pulmonary embolism), Post-thrombotic syndrome, Psychiatric problem, and Unspecified diseases of blood and blood-forming organs.     has a past surgical history that includes Vena Cava Filter Placement.    Social History     Socioeconomic History    Marital status: Single     Spouse name: Not on file    Number of children: Not on file    Years of education: Not on file    Highest education level: Not on file

## 2024-02-08 NOTE — ED NOTES
Patient presents to ED for back pain that has been ongoing since Sunday. Patient denies any injuries/trauma that could have caused pain. Reports that pain was gradual onset Sunday and describes location as low R back and flank area. Patient denies any hematuria or dysuria. States he has had a UTI in the past, no hx of kidney stones. Patient a/o x 4 with even non labored respirations, NAD noted.

## 2024-02-09 LAB
MICROORGANISM SPEC CULT: NORMAL
SPECIMEN DESCRIPTION: NORMAL

## 2024-08-19 ENCOUNTER — HOSPITAL ENCOUNTER (EMERGENCY)
Age: 44
Discharge: HOME | End: 2024-08-19
Attending: EMERGENCY MEDICINE
Payer: COMMERCIAL

## 2024-08-19 VITALS
RESPIRATION RATE: 16 BRPM | HEART RATE: 102 BPM | HEIGHT: 74 IN | WEIGHT: 295.64 LBS | TEMPERATURE: 97.7 F | BODY MASS INDEX: 37.94 KG/M2 | SYSTOLIC BLOOD PRESSURE: 164 MMHG | DIASTOLIC BLOOD PRESSURE: 105 MMHG | OXYGEN SATURATION: 98 %

## 2024-08-19 DIAGNOSIS — M10.9 ACUTE GOUT OF RIGHT HAND, UNSPECIFIED CAUSE: Primary | ICD-10-CM

## 2024-08-19 PROCEDURE — 99284 EMERGENCY DEPT VISIT MOD MDM: CPT | Performed by: EMERGENCY MEDICINE

## 2024-08-19 PROCEDURE — 6370000000 HC RX 637 (ALT 250 FOR IP)

## 2024-08-19 PROCEDURE — 6360000002 HC RX W HCPCS

## 2024-08-19 PROCEDURE — 96372 THER/PROPH/DIAG INJ SC/IM: CPT | Performed by: EMERGENCY MEDICINE

## 2024-08-19 RX ORDER — KETOROLAC TROMETHAMINE 30 MG/ML
30 INJECTION, SOLUTION INTRAMUSCULAR; INTRAVENOUS ONCE
Status: COMPLETED | OUTPATIENT
Start: 2024-08-19 | End: 2024-08-19

## 2024-08-19 RX ORDER — COLCHICINE 0.6 MG/1
0.6 TABLET ORAL ONCE
Status: COMPLETED | OUTPATIENT
Start: 2024-08-19 | End: 2024-08-19

## 2024-08-19 RX ORDER — IBUPROFEN 200 MG
600 TABLET ORAL EVERY 8 HOURS PRN
Qty: 30 TABLET | Refills: 0 | Status: SHIPPED | OUTPATIENT
Start: 2024-08-19 | End: 2024-08-26

## 2024-08-19 RX ORDER — COLCHICINE 0.6 MG/1
0.6 TABLET ORAL 2 TIMES DAILY
Qty: 28 TABLET | Refills: 0 | Status: SHIPPED | OUTPATIENT
Start: 2024-08-19 | End: 2024-09-02

## 2024-08-19 RX ADMIN — COLCHICINE 0.6 MG: 0.6 TABLET, FILM COATED ORAL at 08:52

## 2024-08-19 RX ADMIN — KETOROLAC TROMETHAMINE 30 MG: 30 INJECTION, SOLUTION INTRAMUSCULAR; INTRAVENOUS at 08:51

## 2024-08-19 ASSESSMENT — PAIN DESCRIPTION - ORIENTATION: ORIENTATION: RIGHT

## 2024-08-19 ASSESSMENT — LIFESTYLE VARIABLES
HOW MANY STANDARD DRINKS CONTAINING ALCOHOL DO YOU HAVE ON A TYPICAL DAY: PATIENT DOES NOT DRINK
HOW OFTEN DO YOU HAVE A DRINK CONTAINING ALCOHOL: NEVER

## 2024-08-19 ASSESSMENT — PAIN SCALES - GENERAL: PAINLEVEL_OUTOF10: 8

## 2024-08-19 ASSESSMENT — PAIN DESCRIPTION - LOCATION: LOCATION: HAND

## 2024-08-19 ASSESSMENT — PAIN DESCRIPTION - DESCRIPTORS: DESCRIPTORS: THROBBING

## 2024-08-19 NOTE — ED TRIAGE NOTES
Patient presents to the ED via self. Patient states that he has moved and has not been able to locate gout medication. Patient has had vital signs taken.  Patient is in NAD, respirations are even and unlabored, bed in lowest position and call light with in reach. Resident is bedside for evaluation. Writer will continue with plan of care.

## 2024-08-19 NOTE — DISCHARGE INSTRUCTIONS
You were seen for evaluation of pain, swelling in your right hand that is consistent with your prior flares of gout.  You will be discharged home with ibuprofen and colchicine that you can take at home to help with pain and help resolve this flare.  You should take colchicine up to 48 hours following the gout flare.  You need to follow-up outpatient with a primary care doctor in the next 24 to 48 hours for reevaluation of your symptoms.  Return the emergency department immediately for any worsening severe pain, swelling, chest pain or shortness of breath, fevers, chills, numbness, weakness, other new or concerning symptoms

## 2024-08-19 NOTE — ED PROVIDER NOTES
[facility]     Emergency Department     Faculty Attestation    I performed a history and physical examination of the patient and discussed management with the resident. I reviewed the resident’s note and agree with the documented findings and plan of care. Any areas of disagreement are noted on the chart. I was personally present for the key portions of any procedures. I have documented in the chart those procedures where I was not present during the key portions. I have reviewed the emergency nurses triage note. I agree with the chief complaint, past medical history, past surgical history, allergies, medications, social and family history as documented unless otherwise noted below.   For Physician Assistant/ Nurse Practitioner cases/documentation I have personally evaluated this patient and have completed at least one if not all key elements of the E/M (history, physical exam, and MDM). Additional findings are as noted.      Primary Care Physician:  Kelsea Snow APRN    CHIEF COMPLAINT       Chief Complaint   Patient presents with    Gout     Rt hand swelling       RECENT VITALS:   Temp: 97.7 °F (36.5 °C),  Pulse: (!) 102, Respirations: 16, BP: (!) 164/105    LABS:  Labs Reviewed - No data to display      PERTINENT ATTENDING PHYSICIAN COMMENTS:    Patient here with a gout flare to his right hand he usually takes colchicine and ibuprofen    Critical Care          Braeden Plata MD,  MD, FAAEM  Attending Emergency  Physician             Braeden Plata MD  08/19/24 0911